# Patient Record
Sex: MALE | Race: WHITE | Employment: OTHER | URBAN - METROPOLITAN AREA
[De-identification: names, ages, dates, MRNs, and addresses within clinical notes are randomized per-mention and may not be internally consistent; named-entity substitution may affect disease eponyms.]

---

## 2017-01-09 ENCOUNTER — NURSE ONLY (OUTPATIENT)
Dept: INTERNAL MEDICINE CLINIC | Facility: CLINIC | Age: 62
End: 2017-01-09

## 2017-01-09 ENCOUNTER — PRIOR ORIGINAL RECORDS (OUTPATIENT)
Dept: OTHER | Age: 62
End: 2017-01-09

## 2017-01-09 DIAGNOSIS — R31.9 BLOOD IN URINE: ICD-10-CM

## 2017-01-09 DIAGNOSIS — Z00.00 LABORATORY EXAMINATION ORDERED AS PART OF A ROUTINE GENERAL MEDICAL EXAMINATION: Primary | ICD-10-CM

## 2017-01-09 LAB
APPEARANCE: CLEAR
BILIRUBIN: NEGATIVE
GLUCOSE (URINE DIPSTICK): NEGATIVE MG/DL
KETONES (URINE DIPSTICK): NEGATIVE MG/DL
LEUKOCYTES: NEGATIVE
MULTISTIX LOT#: NORMAL NUMERIC
NITRITE, URINE: NEGATIVE
PH, URINE: 5.5 (ref 4.5–8)
PROTEIN (URINE DIPSTICK): NEGATIVE MG/DL
SPECIFIC GRAVITY: 1.02 (ref 1–1.03)
URINE-COLOR: YELLOW
UROBILINOGEN,SEMI-QN: 0.2 MG/DL (ref 0–1.9)

## 2017-01-09 PROCEDURE — 94010 BREATHING CAPACITY TEST: CPT | Performed by: INTERNAL MEDICINE

## 2017-01-09 PROCEDURE — 92551 PURE TONE HEARING TEST AIR: CPT | Performed by: INTERNAL MEDICINE

## 2017-01-09 PROCEDURE — 93000 ELECTROCARDIOGRAM COMPLETE: CPT | Performed by: INTERNAL MEDICINE

## 2017-01-09 PROCEDURE — 87086 URINE CULTURE/COLONY COUNT: CPT | Performed by: INTERNAL MEDICINE

## 2017-01-09 PROCEDURE — 99173 VISUAL ACUITY SCREEN: CPT | Performed by: INTERNAL MEDICINE

## 2017-01-09 NOTE — PROGRESS NOTES
*LOOKIN' BODY RESULTS    YES: x      NO:       REASON TEST NOT PERFORMED:        HEIGHT: 5'10.5    WEIGHT: 208  _____________________________________________________________________________  *VENIPUNCTURE    YES:  x     NO:        REASON VENIPUNCTURE NOT P

## 2017-01-18 ENCOUNTER — PRIOR ORIGINAL RECORDS (OUTPATIENT)
Dept: OTHER | Age: 62
End: 2017-01-18

## 2017-01-19 ENCOUNTER — MED REC SCAN ONLY (OUTPATIENT)
Dept: INTERNAL MEDICINE CLINIC | Facility: CLINIC | Age: 62
End: 2017-01-19

## 2017-01-24 ENCOUNTER — TELEPHONE (OUTPATIENT)
Dept: INTERNAL MEDICINE CLINIC | Facility: CLINIC | Age: 62
End: 2017-01-24

## 2017-01-24 NOTE — TELEPHONE ENCOUNTER
Pt calling regarding [facility] lab billing issue; criselda called [facility] pt was billed in error for his CBC,CMP they will credit account pt responsible for PSA pt aware verbalized understanding

## 2017-01-24 NOTE — PROGRESS NOTES
HEALTH MAINTENANCE:        Immunization History  Administered            Date(s) Administered    Influenza             10/05/2011  11/07/2012  11/18/2013                            11/03/2014  10/20/2015      Influenza Vaccine, No Preserv, 3YR + Negative/Trace mg/dL Final 01/18/2017 10:11 AM Unknown     UROBILINOGEN,SEMI-QN 0.2 0.0 - 1.9 mg/dL Final 01/18/2017 10:11 AM Unknown     NITRITE, URINE neg Negative Final 01/18/2017 10:11 AM Unknown     LEUKOCYTES neg Negative Final 01/18/2017 10:11 AM Un

## 2017-01-25 ENCOUNTER — OFFICE VISIT (OUTPATIENT)
Dept: INTERNAL MEDICINE CLINIC | Facility: CLINIC | Age: 62
End: 2017-01-25

## 2017-01-25 VITALS
SYSTOLIC BLOOD PRESSURE: 132 MMHG | BODY MASS INDEX: 29.45 KG/M2 | OXYGEN SATURATION: 98 % | HEIGHT: 70.5 IN | HEART RATE: 70 BPM | RESPIRATION RATE: 14 BRPM | TEMPERATURE: 98 F | WEIGHT: 208 LBS | DIASTOLIC BLOOD PRESSURE: 82 MMHG

## 2017-01-25 DIAGNOSIS — Z00.01 ENCOUNTER FOR GENERAL ADULT MEDICAL EXAMINATION WITH ABNORMAL FINDINGS: Primary | ICD-10-CM

## 2017-01-25 DIAGNOSIS — R73.03 PREDIABETES: ICD-10-CM

## 2017-01-25 PROCEDURE — 99396 PREV VISIT EST AGE 40-64: CPT | Performed by: INTERNAL MEDICINE

## 2017-01-25 NOTE — PROGRESS NOTES
Pt to have A1C in 3 mo. Mailed order. Sent letter to pt to have him schedule consult with sleep Dr to review CPAP.

## 2017-01-25 NOTE — PROGRESS NOTES
HPI:    Patient ID: Wesley Rodríguez is a 64year old male here for annual 1-25-17    HPI      No new problems:      Review of Systems   Constitutional: Negative.     HENT:        Tonsillectomy,old  facial scar   Eyes: Positive for visual disturbance (fl Rfl:    Simvastatin 10 MG Oral Tab Take 10 mg by mouth nightly. Disp:  Rfl:    Aspirin 81 MG Oral Tab Take 81 mg by mouth daily.  Disp:  Rfl:        Allergies:  Flomax [Tamsulosin]     Dizziness  Aleve                   Rash  Levaquin [Levofloxa*    Diarrhe blood   • Facial laceration 1970     60 stitches to face   • Hemorrhoids, internal 1/12     colonoscopy-Mace   • Diverticulosis 1/12     colonoscopy-Mace   • Gallstones 3/11     minda   • Mitral valve disease 2/04     Manriquez repair   • Rheumatic fever child Fib   • Other[Other] [OTHER] Mother    • psoriasis[Other] [OTHER] Mother    • Heart Disease Sister      TIAs/stent   • High Blood Pressure Sister    • Heart Disorder Sister      mvp   • Heart Disorder Son      bi cuspid valve   • Depression Son    • migrai Ref Range Negative/Trace mg/dL Status: Final   UROBILINOGEN,SEMI-QN Date: 01/18/2017   Value: 0.2  Ref Range 0.0 - 1.9 mg/dL Status: Final   NITRITE, URINE Date: 01/18/2017   Value: neg  Ref Range Negative Status: Final   LEUKOCYTES Date: 01/18/2017   Susanne to 1.0    ADMA: Normal 71    SDMA: Normal 93  ______________________________________________________________________  LIPID PANEL    APOLIPOPROTEIN B: Normal 75    TOTAL CHOLESTEROL: Normal 158    LDL: Normal 84    DIRECT HDL: Normal 57    TRIGLYCERIDES: N TESTS    EKG: Abnormal unchanged  Sinus rhythm  Leftward axis  Inferior wall     SPIROMETRY: Normal    HEARING: Normal    Hearing Screening     Time taken: 1/9/2017  9:48 AM    Entry User: David Siu   Screening Method:  Audiometry    Left Ear @ 25dBHL -

## 2017-01-31 NOTE — PROGRESS NOTES
1      REASON FOR VISIT: [facility] annual on 01/25/2017:    1.  Regarding cardiovascular health, you have a history of mitral valve repair that is stable on recent echo in 09/2016, hypertension under control, lipids under control, and prediabetes which could im control with diet, exercise, and further weight and sugar reduction will help your A1C to be rechecked in 3 months. I know you are on the way to [place] today, enjoy your time off and we will see you in 3 months.

## 2017-02-03 ENCOUNTER — MED REC SCAN ONLY (OUTPATIENT)
Dept: INTERNAL MEDICINE CLINIC | Facility: CLINIC | Age: 62
End: 2017-02-03

## 2017-02-03 ENCOUNTER — TELEPHONE (OUTPATIENT)
Dept: INTERNAL MEDICINE CLINIC | Facility: CLINIC | Age: 62
End: 2017-02-03

## 2017-02-03 NOTE — TELEPHONE ENCOUNTER
Sachi Villa would like to talk to the nurse regarding some symptoms he has since he's been home from [place]. He doesn't know if its allergies or something else. Itchy eyes, sneezing, rash on elbows and behind knees.   Please call [phone], home or [phone]

## 2017-02-03 NOTE — TELEPHONE ENCOUNTER
Pt reports no fever; says symptoms have been going on for 3-4 days. Pt wanted to know if he can try Benadryl. I told him that is fine, he can try Benadryl OTC and use as directed on box. Reports he has used Flonase and that helps with sneezing.    Pt al

## 2017-02-06 ENCOUNTER — TELEPHONE (OUTPATIENT)
Dept: INTERNAL MEDICINE CLINIC | Facility: CLINIC | Age: 62
End: 2017-02-06

## 2017-02-06 ENCOUNTER — PRIOR ORIGINAL RECORDS (OUTPATIENT)
Dept: OTHER | Age: 62
End: 2017-02-06

## 2017-02-06 NOTE — TELEPHONE ENCOUNTER
Morena Cornell is still having very itchy eyes after having taking Benedryl and [employer] eyedrops all weekend, per advice from the nurse last .  He thinks he may have scratched his eyes from rubbing them.  He would rather not have an appointment and is wondering if th

## 2017-02-06 NOTE — TELEPHONE ENCOUNTER
Pt reports eyes have not gotten worse, but no improvement either. Itching, burning, little bit of discharge. Still some congestion. Pt thinks allergy related. Told pt he needs to be seen so that Dr can decide what he should be using.    Future Appointm

## 2017-02-08 LAB
ALBUMIN: 4.7 G/DL
ALKALINE PHOSPHATATE(ALK PHOS): 56 IU/L
APOLIPOPROTEIN(B): 75 MG/DL
APOLIPOPROTEIN(B): 75 MG/DL
BILIRUBIN TOTAL: 0.6 MG/DL
BUN: 18 MG/DL
CALCIUM: 9.6 MG/DL
CHLORIDE: 102 MEQ/L
CHOLESTEROL, TOTAL: 158 MG/DL
CHOLESTEROL, TOTAL: 158 MG/DL
CREATININE, SERUM: 0.87 MG/DL
GLOBULIN: 2.5 G/DL
GLUCOSE: 118 MG/DL
GLUCOSE: 118 MG/DL
HDL CHOLESTEROL: 57 MG/DL
HDL CHOLESTEROL: 57 MG/DL
HEMATOCRIT: 42.1 %
HEMOGLOBIN A1C: 6.4 %
HEMOGLOBIN A1C: 6.4 %
HEMOGLOBIN: 14.2 G/DL
HSCRP(TYPE Y): 1.1 YES
LDL CHOLESTEROL: 84 MG/DL
LDL CHOLESTEROL: 84 MG/DL
PLATELETS: 195 K/UL
POTASSIUM, SERUM: 4.3 MEQ/L
PROTEIN, TOTAL: 7.2 G/DL
RED BLOOD COUNT: 5.23 X 10-6/U
SGOT (AST): 25 IU/L
SGPT (ALT): 45 IU/L
SODIUM: 141 MEQ/L
THYROID STIMULATING HORMONE: 2.34 MLU/L
THYROID STIMULATING HORMONE: 2.34 MLU/L
TRIGLYCERIDES: 84 MG/DL
TRIGLYCERIDES: 84 MG/DL
URIC ACID: 6.3 MG/DL
URIC ACID: 6.3 MG/DL
VITAMIN D 25-OH: 49.7 NG/ML
VITAMIN D 25-OH: 49.7 NG/ML
WHITE BLOOD COUNT: 7.4 X 10-3/U

## 2017-02-09 ENCOUNTER — TELEPHONE (OUTPATIENT)
Dept: INTERNAL MEDICINE CLINIC | Facility: CLINIC | Age: 62
End: 2017-02-09

## 2017-02-09 RX ORDER — AZITHROMYCIN 250 MG/1
TABLET, FILM COATED ORAL
Qty: 6 TABLET | Refills: 0 | Status: SHIPPED | OUTPATIENT
Start: 2017-02-09 | End: 2017-10-17 | Stop reason: ALTCHOICE

## 2017-02-09 NOTE — TELEPHONE ENCOUNTER
Pt c/o of sore throat, cough, nasal congestion, flying on Saturday vo dr Shivani Jeter start zpak follow tomorrow apt for 8:30am pt verbalized undersatnding

## 2017-02-10 ENCOUNTER — OFFICE VISIT (OUTPATIENT)
Dept: INTERNAL MEDICINE CLINIC | Facility: CLINIC | Age: 62
End: 2017-02-10

## 2017-02-10 VITALS
HEART RATE: 80 BPM | SYSTOLIC BLOOD PRESSURE: 140 MMHG | DIASTOLIC BLOOD PRESSURE: 70 MMHG | TEMPERATURE: 98 F | OXYGEN SATURATION: 97 % | RESPIRATION RATE: 14 BRPM | WEIGHT: 213 LBS | BODY MASS INDEX: 30 KG/M2

## 2017-02-10 DIAGNOSIS — J01.10 ACUTE NON-RECURRENT FRONTAL SINUSITIS: Primary | ICD-10-CM

## 2017-02-10 PROCEDURE — 99213 OFFICE O/P EST LOW 20 MIN: CPT | Performed by: INTERNAL MEDICINE

## 2017-02-10 PROCEDURE — 96372 THER/PROPH/DIAG INJ SC/IM: CPT | Performed by: INTERNAL MEDICINE

## 2017-02-10 RX ORDER — METHYLPREDNISOLONE 4 MG/1
TABLET ORAL
Qty: 1 KIT | Refills: 0 | Status: SHIPPED | OUTPATIENT
Start: 2017-02-10 | End: 2017-10-17 | Stop reason: ALTCHOICE

## 2017-02-10 RX ORDER — METHYLPREDNISOLONE ACETATE 40 MG/ML
40 INJECTION, SUSPENSION INTRA-ARTICULAR; INTRALESIONAL; INTRAMUSCULAR; SOFT TISSUE ONCE
Status: COMPLETED | OUTPATIENT
Start: 2017-02-10 | End: 2017-02-10

## 2017-02-10 RX ADMIN — METHYLPREDNISOLONE ACETATE 40 MG: 40 INJECTION, SUSPENSION INTRA-ARTICULAR; INTRALESIONAL; INTRAMUSCULAR; SOFT TISSUE at 09:07:00

## 2017-02-10 NOTE — PROGRESS NOTES
1 week hx what sounds like allergic reaction while in [place]=rash, itching eyes, stuffed nose. Then couple days ago worse, now with hoarseness and sore throat. Has severe rhinitis. Ears and lungs clear. Rx Nasalcort, Medrol, Zpack and Ophcon already started.  Adolfo Crockett

## 2017-02-14 ENCOUNTER — PRIOR ORIGINAL RECORDS (OUTPATIENT)
Dept: OTHER | Age: 62
End: 2017-02-14

## 2017-03-08 ENCOUNTER — PRIOR ORIGINAL RECORDS (OUTPATIENT)
Dept: OTHER | Age: 62
End: 2017-03-08

## 2017-03-10 ENCOUNTER — PRIOR ORIGINAL RECORDS (OUTPATIENT)
Dept: OTHER | Age: 62
End: 2017-03-10

## 2017-03-27 ENCOUNTER — MED REC SCAN ONLY (OUTPATIENT)
Dept: INTERNAL MEDICINE CLINIC | Facility: CLINIC | Age: 62
End: 2017-03-27

## 2017-05-01 ENCOUNTER — PRIOR ORIGINAL RECORDS (OUTPATIENT)
Dept: OTHER | Age: 62
End: 2017-05-01

## 2017-09-19 ENCOUNTER — MED REC SCAN ONLY (OUTPATIENT)
Dept: INTERNAL MEDICINE CLINIC | Facility: CLINIC | Age: 62
End: 2017-09-19

## 2017-10-16 ENCOUNTER — PRIOR ORIGINAL RECORDS (OUTPATIENT)
Dept: OTHER | Age: 62
End: 2017-10-16

## 2017-10-16 ENCOUNTER — PATIENT MESSAGE (OUTPATIENT)
Dept: INTERNAL MEDICINE CLINIC | Facility: CLINIC | Age: 62
End: 2017-10-16

## 2017-10-16 ENCOUNTER — LAB ENCOUNTER (OUTPATIENT)
Dept: LAB | Age: 62
End: 2017-10-16
Attending: INTERNAL MEDICINE
Payer: COMMERCIAL

## 2017-10-16 DIAGNOSIS — R73.03 PREDIABETES: ICD-10-CM

## 2017-10-16 DIAGNOSIS — N40.1 BENIGN NODULAR PROSTATIC HYPERPLASIA WITH LOWER URINARY TRACT SYMPTOMS: ICD-10-CM

## 2017-10-16 DIAGNOSIS — R06.00 DYSPNEA, PAROXYSMAL NOCTURNAL: ICD-10-CM

## 2017-10-16 DIAGNOSIS — R42 DIZZINESS AND GIDDINESS: ICD-10-CM

## 2017-10-16 DIAGNOSIS — R53.83 FATIGUE: ICD-10-CM

## 2017-10-16 DIAGNOSIS — E55.9 AVITAMINOSIS D: Primary | ICD-10-CM

## 2017-10-16 DIAGNOSIS — E78.00 PURE HYPERCHOLESTEROLEMIA: ICD-10-CM

## 2017-10-16 PROCEDURE — 82306 VITAMIN D 25 HYDROXY: CPT

## 2017-10-16 PROCEDURE — 84153 ASSAY OF PSA TOTAL: CPT

## 2017-10-16 PROCEDURE — 83036 HEMOGLOBIN GLYCOSYLATED A1C: CPT

## 2017-10-16 PROCEDURE — 36415 COLL VENOUS BLD VENIPUNCTURE: CPT

## 2017-10-16 PROCEDURE — 80053 COMPREHEN METABOLIC PANEL: CPT

## 2017-10-16 PROCEDURE — 80061 LIPID PANEL: CPT

## 2017-10-16 NOTE — TELEPHONE ENCOUNTER
From: Cheyenne Lindo  To: Alondra Johnson MD  Sent: 10/16/2017 11:23 AM CDT  Subject: Non-Urgent Medical Question    Our son and daughter in law are expecting their first child and our first grandchild in December.  I received a TDAP vaccine on 11-

## 2017-10-16 NOTE — TELEPHONE ENCOUNTER
Dr Luz Marina Stewart - en's Tdap should be good for a few more years. .. I don't believe he needs anything else before the baby comes, correct? Please advise.

## 2017-10-17 ENCOUNTER — PRIOR ORIGINAL RECORDS (OUTPATIENT)
Dept: OTHER | Age: 62
End: 2017-10-17

## 2017-10-17 PROCEDURE — 81015 MICROSCOPIC EXAM OF URINE: CPT | Performed by: UROLOGY

## 2017-10-17 NOTE — TELEPHONE ENCOUNTER
From: Alejandro Barnes  To: Elida Chapman MD  Sent: 10/16/2017 11:20 AM CDT  Subject: Other    Please note in my records that I received a flu vaccine (at [facility]) on October 14, 2017. Flucelvax Quad [id number] SYR.

## 2017-10-23 ENCOUNTER — HOSPITAL ENCOUNTER (OUTPATIENT)
Dept: ULTRASOUND IMAGING | Facility: HOSPITAL | Age: 62
Discharge: HOME | End: 2017-10-23
Attending: UROLOGY
Payer: COMMERCIAL

## 2017-10-23 DIAGNOSIS — N40.1 BENIGN NODULAR PROSTATIC HYPERPLASIA WITH LOWER URINARY TRACT SYMPTOMS: ICD-10-CM

## 2017-10-23 DIAGNOSIS — R36.1 HEMATOSPERMIA: ICD-10-CM

## 2017-10-23 PROCEDURE — 76775 US EXAM ABDO BACK WALL LIM: CPT | Performed by: UROLOGY

## 2017-10-26 LAB
ALBUMIN: 3.8 G/DL
ALKALINE PHOSPHATATE(ALK PHOS): 72 IU/L
BILIRUBIN TOTAL: 0.8 MG/DL
BUN: 14 MG/DL
CALCIUM: 8.8 MG/DL
CHLORIDE: 109 MEQ/L
CHOLESTEROL, TOTAL: 134 MG/DL
CREATININE, SERUM: 0.87 MG/DL
GLUCOSE: 114 MG/DL
HDL CHOLESTEROL: 67 MG/DL
HEMOGLOBIN A1C: 6.3 %
LDL CHOLESTEROL: 51 MG/DL
POTASSIUM, SERUM: 3.8 MEQ/L
PROTEIN, TOTAL: 7.4 G/DL
SGOT (AST): 20 IU/L
SGPT (ALT): 34 IU/L
SODIUM: 142 MEQ/L
TRIGLYCERIDES: 81 MG/DL
VITAMIN D 25-OH: 46.8 NG/ML

## 2017-11-03 ENCOUNTER — HOSPITAL ENCOUNTER (OUTPATIENT)
Dept: CV DIAGNOSTICS | Facility: HOSPITAL | Age: 62
Discharge: HOME | End: 2017-11-03
Attending: INTERNAL MEDICINE

## 2017-11-03 ENCOUNTER — HOSPITAL ENCOUNTER (OUTPATIENT)
Dept: CV DIAGNOSTICS | Facility: HOSPITAL | Age: 62
End: 2017-11-03
Attending: INTERNAL MEDICINE

## 2017-11-03 ENCOUNTER — MYAURORA ACCOUNT LINK (OUTPATIENT)
Dept: OTHER | Age: 62
End: 2017-11-03

## 2017-11-03 DIAGNOSIS — Z95.2 HX OF PROSTHETIC MITRAL VALVE: ICD-10-CM

## 2017-11-03 DIAGNOSIS — I34.1 MVP (MITRAL VALVE PROLAPSE): ICD-10-CM

## 2017-11-06 ENCOUNTER — PRIOR ORIGINAL RECORDS (OUTPATIENT)
Dept: OTHER | Age: 62
End: 2017-11-06

## 2018-01-02 ENCOUNTER — TELEPHONE (OUTPATIENT)
Dept: INTERNAL MEDICINE CLINIC | Facility: CLINIC | Age: 63
End: 2018-01-02

## 2018-01-02 RX ORDER — OSELTAMIVIR PHOSPHATE 75 MG/1
75 CAPSULE ORAL 2 TIMES DAILY
Qty: 10 CAPSULE | Refills: 0 | Status: SHIPPED | OUTPATIENT
Start: 2018-01-02 | End: 2018-03-27

## 2018-01-02 NOTE — TELEPHONE ENCOUNTER
Patient states Dr. Mayda Cabrera told him to start taking his wife's Tamiflu and that Dr. Daniela Early would call in Rx for full strength BID not once today and let patient know when he can pick it up.

## 2018-01-24 ENCOUNTER — TELEPHONE (OUTPATIENT)
Dept: INTERNAL MEDICINE CLINIC | Facility: CLINIC | Age: 63
End: 2018-01-24

## 2018-02-16 ENCOUNTER — PRIOR ORIGINAL RECORDS (OUTPATIENT)
Dept: OTHER | Age: 63
End: 2018-02-16

## 2018-02-28 ENCOUNTER — PRIOR ORIGINAL RECORDS (OUTPATIENT)
Dept: OTHER | Age: 63
End: 2018-02-28

## 2018-03-08 ENCOUNTER — NURSE ONLY (OUTPATIENT)
Dept: INTERNAL MEDICINE CLINIC | Facility: CLINIC | Age: 63
End: 2018-03-08

## 2018-03-08 DIAGNOSIS — Z00.00 LABORATORY EXAMINATION ORDERED AS PART OF A ROUTINE GENERAL MEDICAL EXAMINATION: Primary | ICD-10-CM

## 2018-03-08 LAB
AMB EXT LDL CHOLESTEROL, DIRECT: 74 MG/DL
AMB EXT PSA SCREEN: 1.87 NG/ML
BILIRUB UR QL STRIP.AUTO: NEGATIVE
CLARITY UR REFRACT.AUTO: CLEAR
COLOR UR AUTO: YELLOW
GLUCOSE UR STRIP.AUTO-MCNC: NEGATIVE MG/DL
KETONES UR STRIP.AUTO-MCNC: NEGATIVE MG/DL
LEUKOCYTE ESTERASE UR QL STRIP.AUTO: NEGATIVE
NITRITE UR QL STRIP.AUTO: NEGATIVE
PH UR STRIP.AUTO: 5 [PH] (ref 4.5–8)
PROT UR STRIP.AUTO-MCNC: NEGATIVE MG/DL
RBC UR QL AUTO: NEGATIVE
SP GR UR STRIP.AUTO: 1.02 (ref 1–1.03)
UROBILINOGEN UR STRIP.AUTO-MCNC: <2 MG/DL

## 2018-03-08 PROCEDURE — 93000 ELECTROCARDIOGRAM COMPLETE: CPT | Performed by: INTERNAL MEDICINE

## 2018-03-08 PROCEDURE — 81003 URINALYSIS AUTO W/O SCOPE: CPT | Performed by: INTERNAL MEDICINE

## 2018-03-08 PROCEDURE — 99173 VISUAL ACUITY SCREEN: CPT | Performed by: INTERNAL MEDICINE

## 2018-03-08 PROCEDURE — 94010 BREATHING CAPACITY TEST: CPT | Performed by: INTERNAL MEDICINE

## 2018-03-08 PROCEDURE — 92551 PURE TONE HEARING TEST AIR: CPT | Performed by: INTERNAL MEDICINE

## 2018-03-08 NOTE — PROGRESS NOTES
*LOOKIN' BODY RESULTS    YES: X      NO:       REASON TEST NOT PERFORMED:        HEIGHT: 5'10.5   WEIGHT: 203  _____________________________________________________________________________  *VENIPUNCTURE    YES:  X     NO:        REASON VENIPUNCTURE NOT PE

## 2018-03-13 ENCOUNTER — MED REC SCAN ONLY (OUTPATIENT)
Dept: INTERNAL MEDICINE CLINIC | Facility: CLINIC | Age: 63
End: 2018-03-13

## 2018-03-20 ENCOUNTER — PATIENT MESSAGE (OUTPATIENT)
Dept: INTERNAL MEDICINE CLINIC | Facility: CLINIC | Age: 63
End: 2018-03-20

## 2018-03-20 NOTE — TELEPHONE ENCOUNTER
From: Jamie Bobby  To: Sidra Bloom MD  Sent: 3/20/2018 2:00 PM CDT  Subject: Non-Urgent Medical Question    Should I receive the new shingles vaccine, Shingrix?  I understand that the new Shingrix vaccine is about 97% effective for 20 years, wherea

## 2018-03-22 NOTE — PROGRESS NOTES
HEALTH MAINTENANCE:        Immunization History  Administered            Date(s) Administered    Influenza             10/05/2011  11/07/2012  11/18/2013                            11/03/2014  10/20/2015  10/14/2017      Influenza Vaccine, No Preserv, Samaria Lin - 500 Hz: Pass   Left Ear @ 40dBHL - 1000 Hz: Pass Right Ear @ 40 dBHL - 1000 Hz: Pass   Left Ear @ 40dBHL - 2000 Hz: Pass Right Ear @ 40 dBHL - 2000 Hz: Pass   Left Ear @ 40dBHL - 4000 Hz: Pass Right Ear @ 40 dBHL - 4000 Hz: Pass

## 2018-03-27 ENCOUNTER — OFFICE VISIT (OUTPATIENT)
Dept: INTERNAL MEDICINE CLINIC | Facility: CLINIC | Age: 63
End: 2018-03-27

## 2018-03-27 VITALS
RESPIRATION RATE: 12 BRPM | TEMPERATURE: 98 F | BODY MASS INDEX: 28.74 KG/M2 | WEIGHT: 203 LBS | HEIGHT: 70.5 IN | DIASTOLIC BLOOD PRESSURE: 77 MMHG | OXYGEN SATURATION: 98 % | SYSTOLIC BLOOD PRESSURE: 137 MMHG | HEART RATE: 68 BPM

## 2018-03-27 DIAGNOSIS — Z80.0 FAMILY HISTORY OF COLON CANCER: ICD-10-CM

## 2018-03-27 DIAGNOSIS — G47.33 OSA (OBSTRUCTIVE SLEEP APNEA): ICD-10-CM

## 2018-03-27 DIAGNOSIS — Z98.890 STATUS POST MITRAL VALVE REPAIR: ICD-10-CM

## 2018-03-27 DIAGNOSIS — R73.03 PREDIABETES: ICD-10-CM

## 2018-03-27 DIAGNOSIS — Z23 NEED FOR VACCINATION: ICD-10-CM

## 2018-03-27 DIAGNOSIS — I10 ESSENTIAL HYPERTENSION: ICD-10-CM

## 2018-03-27 DIAGNOSIS — I65.23 CAROTID ARTERY PLAQUE, BILATERAL: ICD-10-CM

## 2018-03-27 DIAGNOSIS — R97.20 BPH WITH ELEVATED PSA: ICD-10-CM

## 2018-03-27 DIAGNOSIS — N40.0 BPH WITH ELEVATED PSA: ICD-10-CM

## 2018-03-27 DIAGNOSIS — Z00.01 ENCOUNTER FOR GENERAL ADULT MEDICAL EXAMINATION WITH ABNORMAL FINDINGS: Primary | ICD-10-CM

## 2018-03-27 PROCEDURE — 90732 PPSV23 VACC 2 YRS+ SUBQ/IM: CPT | Performed by: INTERNAL MEDICINE

## 2018-03-27 PROCEDURE — 99396 PREV VISIT EST AGE 40-64: CPT | Performed by: INTERNAL MEDICINE

## 2018-03-27 PROCEDURE — 90471 IMMUNIZATION ADMIN: CPT | Performed by: INTERNAL MEDICINE

## 2018-03-28 ENCOUNTER — TELEPHONE (OUTPATIENT)
Dept: INTERNAL MEDICINE CLINIC | Facility: CLINIC | Age: 63
End: 2018-03-28

## 2018-03-28 DIAGNOSIS — R73.03 PREDIABETES: Primary | ICD-10-CM

## 2018-03-28 DIAGNOSIS — G47.33 OSA (OBSTRUCTIVE SLEEP APNEA): ICD-10-CM

## 2018-03-28 NOTE — PROGRESS NOTES
HPI:    Patient ID: Yony Peace is a 58year old male CPE 3-27-18:     Multiple but stable medical problems        Review of Systems   Constitutional: Negative.     HENT:        Tonsillectomy, old  facial scar   Eyes: Positive for visual disturbance by mouth daily. Disp:  Rfl:    Simvastatin 10 MG Oral Tab Take 10 mg by mouth nightly. Disp:  Rfl:    Aspirin 81 MG Oral Tab Take 81 mg by mouth daily.  Disp:  Rfl:        Allergies:  Flomax [Tamsulosin]     Dizziness  Aleve                   Rash  Lachelleuin Chickenpox child    measles, mumps   • Diverticulosis 1/12    colonoscopy-oRdri   • Dyslipidemia    • Essential hypertension    • Facial laceration 1970    60 stitches to face   • Family history of colon cancer     father   • Gallstones 3/11    minda   • He Mother    • psoriasis Adamaris Martinez Mother    • Heart Disease Sister      TIAs/stent   • High Blood Pressure Sister    • Heart Disorder Sister      mvp   • Heart Disorder Son      bi cuspid valve   • Depression Son    • colon cancer [OTHER] Paternal Grandfather Health Inventory: Normal  Metabolic syndrome (hypertension, lipid abnormality, obesity, pre-diabetic): Prediabetes, lipids, HTN present  Mini-Mental Examination: Normal in conversation  Nutritional Screen: Reviewed and discussed   SAFE Questions (Domestic 2000 Hz: Pass Right Ear @ 25dBHL - 2000 Hz: Pass   Left Ear @ 25dBHL - 4000 Hz: Pass Right Ear @ 25dBHL - 4000 Hz: Pass   Left Ear @ 40dBHL - 500 Hz: Pass Right Ear @ 40 dBHL - 500 Hz: Pass   Left Ear @ 40dBHL - 1000 Hz: Pass Right Ear @ 40 dBHL - 1000 Hz: 5yrs-family hx colon CA. No reason to have lung cancer being a nonsmoker. No sign of skin cancer on exam.    3. Obstructive sleep apnea, stable on CPAP.  Recommend you see your sleep physician if not done in last 2-3 years just to update the equipment and s

## 2018-03-28 NOTE — TELEPHONE ENCOUNTER
Per Dr Yolette Negron:   1. Have A1C repeated 3 mo. 2. Have TMAO 6 mo - copy to [place]  3. Ask when pt was last evaluated for CPAP. Recommendation is every 2-3 years for updated equipment/settings.

## 2018-05-01 ENCOUNTER — HOSPITAL ENCOUNTER (OUTPATIENT)
Dept: CV DIAGNOSTICS | Facility: HOSPITAL | Age: 63
Discharge: HOME | End: 2018-05-01
Attending: INTERNAL MEDICINE
Payer: COMMERCIAL

## 2018-05-01 ENCOUNTER — TELEPHONE (OUTPATIENT)
Dept: INTERNAL MEDICINE CLINIC | Facility: CLINIC | Age: 63
End: 2018-05-01

## 2018-05-01 ENCOUNTER — HOSPITAL ENCOUNTER (OUTPATIENT)
Dept: ULTRASOUND IMAGING | Facility: HOSPITAL | Age: 63
Discharge: HOME | End: 2018-05-01
Attending: INTERNAL MEDICINE
Payer: COMMERCIAL

## 2018-05-01 DIAGNOSIS — R07.2 CHEST PAIN, PRECORDIAL: ICD-10-CM

## 2018-05-01 DIAGNOSIS — R06.00 DYSPNEA, PAROXYSMAL NOCTURNAL: ICD-10-CM

## 2018-05-01 DIAGNOSIS — Z95.2 PERSONAL HISTORY OF HEART VALVE REPLACEMENT: ICD-10-CM

## 2018-05-01 DIAGNOSIS — I65.23 CAROTID ARTERY PLAQUE, BILATERAL: ICD-10-CM

## 2018-05-01 PROCEDURE — 93018 CV STRESS TEST I&R ONLY: CPT | Performed by: INTERNAL MEDICINE

## 2018-05-01 PROCEDURE — 93017 CV STRESS TEST TRACING ONLY: CPT | Performed by: INTERNAL MEDICINE

## 2018-05-01 PROCEDURE — 93350 STRESS TTE ONLY: CPT | Performed by: INTERNAL MEDICINE

## 2018-05-01 PROCEDURE — 93880 EXTRACRANIAL BILAT STUDY: CPT | Performed by: INTERNAL MEDICINE

## 2018-05-01 NOTE — TELEPHONE ENCOUNTER
----- Message from Devorah Braswell MD sent at 5/1/2018 11:21 AM CDT -----  Right clear, left mild plaque cont statin and ASA  Recheck 3yrs

## 2018-05-01 NOTE — TELEPHONE ENCOUNTER
Heaven Krueger MD sent to [facility]             Normal stress echo      PC regarding Echo normal and Carotid US results. Will repeat carotid US in 3 yrs.

## 2018-05-02 ENCOUNTER — PRIOR ORIGINAL RECORDS (OUTPATIENT)
Dept: OTHER | Age: 63
End: 2018-05-02

## 2018-05-07 ENCOUNTER — PRIOR ORIGINAL RECORDS (OUTPATIENT)
Dept: OTHER | Age: 63
End: 2018-05-07

## 2018-05-08 ENCOUNTER — MED REC SCAN ONLY (OUTPATIENT)
Dept: INTERNAL MEDICINE CLINIC | Facility: CLINIC | Age: 63
End: 2018-05-08

## 2018-10-23 ENCOUNTER — PRIOR ORIGINAL RECORDS (OUTPATIENT)
Dept: OTHER | Age: 63
End: 2018-10-23

## 2018-10-30 ENCOUNTER — MYAURORA ACCOUNT LINK (OUTPATIENT)
Dept: OTHER | Age: 63
End: 2018-10-30

## 2018-10-30 ENCOUNTER — HOSPITAL ENCOUNTER (OUTPATIENT)
Dept: CV DIAGNOSTICS | Facility: HOSPITAL | Age: 63
Discharge: HOME | End: 2018-10-30
Attending: INTERNAL MEDICINE

## 2018-10-30 DIAGNOSIS — Z95.2 HISTORY OF MITRAL VALVE PROSTHESIS: ICD-10-CM

## 2018-10-30 DIAGNOSIS — R06.00 DYSPNEA, UNSPECIFIED TYPE: ICD-10-CM

## 2018-11-05 ENCOUNTER — PRIOR ORIGINAL RECORDS (OUTPATIENT)
Dept: OTHER | Age: 63
End: 2018-11-05

## 2018-11-05 ENCOUNTER — MYAURORA ACCOUNT LINK (OUTPATIENT)
Dept: OTHER | Age: 63
End: 2018-11-05

## 2018-11-07 ENCOUNTER — TELEPHONE (OUTPATIENT)
Dept: INTERNAL MEDICINE CLINIC | Facility: CLINIC | Age: 63
End: 2018-11-07

## 2018-11-07 NOTE — TELEPHONE ENCOUNTER
Patient needs a new prescription for test strips. He would like to pick them up [facility] on [address] in [place].

## 2018-11-20 ENCOUNTER — PATIENT MESSAGE (OUTPATIENT)
Dept: INTERNAL MEDICINE CLINIC | Facility: CLINIC | Age: 63
End: 2018-11-20

## 2018-11-20 ENCOUNTER — TELEPHONE (OUTPATIENT)
Dept: INTERNAL MEDICINE CLINIC | Facility: CLINIC | Age: 63
End: 2018-11-20

## 2018-11-20 DIAGNOSIS — G47.33 OSA (OBSTRUCTIVE SLEEP APNEA): Primary | ICD-10-CM

## 2018-11-20 NOTE — TELEPHONE ENCOUNTER
Will enter new sleep study to [address] [place] [address] location. Previous on to [address] [facility]. Entered new sleep study/ CPAP titration to [facility] location and called pt. He said he was able to schedule study with previous order.

## 2018-11-20 NOTE — TELEPHONE ENCOUNTER
From: Clara Torrez  To: Rafy Blackwell MD  Sent: 11/20/2018 3:08 PM CST  Subject: Other    Please update my records to reflect:  9/25/28 flu shot  10/30/18 echocardiogram at [facility]  11/20/18 eye exam  12/10/18 scheduled Sleep study

## 2018-11-20 NOTE — TELEPHONE ENCOUNTER
From: Wesley Rodríguez  To: Maria M Rea MD  Sent: 11/20/2018 3:10 PM CST  Subject: Non-Urgent Medical Question    What are you now recommending regarding the new shingles vaccine?    I had zostavax on 7/15/2015

## 2018-11-20 NOTE — TELEPHONE ENCOUNTER
Patient is trying to schedule a sleep study at the [facility] on [address], and they are telling him they do not see the order in [address] [place] [address] Rd. The order in [address] [place] [address] Rd has the patient going to the [facility].     Please place new order with [employer] [employer] [employer] location,

## 2018-12-04 ENCOUNTER — MED REC SCAN ONLY (OUTPATIENT)
Dept: INTERNAL MEDICINE CLINIC | Facility: CLINIC | Age: 63
End: 2018-12-04

## 2018-12-05 ENCOUNTER — TELEPHONE (OUTPATIENT)
Dept: INTERNAL MEDICINE CLINIC | Facility: CLINIC | Age: 63
End: 2018-12-05

## 2018-12-05 NOTE — TELEPHONE ENCOUNTER
Patient had sleep study originally scheduled for this Monday Dec 10th, but just received a call stating they would have to cancel the appointment due to insurance not covering it.  He asks that a nurse look into and make any appropriate changes to codes so

## 2018-12-05 NOTE — TELEPHONE ENCOUNTER
Notes per referral department: [facility] generally requires Home Sleep study done FIRST before authorizing in lab sleep study. Note    12/5/18: in lab titration study \"denied\". Lack of medical necessity, peer to peer can be done within 21 days.  APAP does not

## 2018-12-05 NOTE — TELEPHONE ENCOUNTER
Patient is calling to ask Dr. Dona Proctor to schedule a peer to peer review regarding the sleep study that was denied by [facility]. The number to call is [phone], and the VNRV#[id number].     The patient needs this done ASAP as he has asked the sleep study to res

## 2018-12-07 NOTE — TELEPHONE ENCOUNTER
Spoke with Escobar Vasques at [facility]. There is no such thing as a CPAP Titration home study. Can provide a home sleep study only. Has previously discussed this with patient.  Recommending patient see a sleep  at [facility] lung or [facility]/

## 2018-12-07 NOTE — TELEPHONE ENCOUNTER
Left message at [facility] requesting return call. Do they provide in house CPAP titration study due to insurance denying it performed at a facility but will cover for home titration.

## 2018-12-10 ENCOUNTER — TELEPHONE (OUTPATIENT)
Dept: INTERNAL MEDICINE CLINIC | Facility: CLINIC | Age: 63
End: 2018-12-10

## 2018-12-10 DIAGNOSIS — G47.33 OSA (OBSTRUCTIVE SLEEP APNEA): Primary | ICD-10-CM

## 2018-12-10 NOTE — TELEPHONE ENCOUNTER
PC to clarify insurance denial of in lab sleep study titration despite has been on Cpap for 15 yrs. Confirmed with both  Halley Andrew, home Cpap titration is not an option. His insurance requires him to do home sleep study.

## 2018-12-13 NOTE — TELEPHONE ENCOUNTER
PC from pt   Pt called Katrina - talked to [facility] from appeals     Home Sleep Study can be used to do titration for pt. Dr. Montana Frost- please consider doing a script for a Home Sleep Study to determine titration?      Respond - neither Noemi Rodriguez nor

## 2018-12-13 NOTE — TELEPHONE ENCOUNTER
PC to pt  Referrals and script processed for home sleep study and general sleep study  Referral processed for Dr. Paula Sam - sleep specialist    Phone numbers for both given to pt who will do the f/u phone calls, copies of referrals put into mail for pt

## 2018-12-14 ENCOUNTER — PRIOR ORIGINAL RECORDS (OUTPATIENT)
Dept: OTHER | Age: 63
End: 2018-12-14

## 2018-12-18 ENCOUNTER — MED REC SCAN ONLY (OUTPATIENT)
Dept: INTERNAL MEDICINE CLINIC | Facility: CLINIC | Age: 63
End: 2018-12-18

## 2018-12-20 ENCOUNTER — TELEPHONE (OUTPATIENT)
Dept: INTERNAL MEDICINE CLINIC | Facility: CLINIC | Age: 63
End: 2018-12-20

## 2018-12-20 ENCOUNTER — MED REC SCAN ONLY (OUTPATIENT)
Dept: INTERNAL MEDICINE CLINIC | Facility: CLINIC | Age: 63
End: 2018-12-20

## 2018-12-20 NOTE — TELEPHONE ENCOUNTER
A benefit advocate from [facility] called just wanting to inform our office that patient's sleep study has been approved by them with the the approval date of 12/13/18. No call back needed, just wanted to let us know.

## 2018-12-20 NOTE — TELEPHONE ENCOUNTER
PC to  Pt     Inpatient titration sleep study now scheduled for 12/26/18 with insurance companies approval.

## 2018-12-26 ENCOUNTER — OFFICE VISIT (OUTPATIENT)
Dept: SLEEP CENTER | Facility: HOSPITAL | Age: 63
End: 2018-12-26
Attending: INTERNAL MEDICINE
Payer: COMMERCIAL

## 2018-12-26 DIAGNOSIS — G47.33 OSA (OBSTRUCTIVE SLEEP APNEA): ICD-10-CM

## 2018-12-26 PROCEDURE — 95811 POLYSOM 6/>YRS CPAP 4/> PARM: CPT

## 2018-12-27 ENCOUNTER — PATIENT MESSAGE (OUTPATIENT)
Dept: INTERNAL MEDICINE CLINIC | Facility: CLINIC | Age: 63
End: 2018-12-27

## 2018-12-27 ENCOUNTER — PRIOR ORIGINAL RECORDS (OUTPATIENT)
Dept: OTHER | Age: 63
End: 2018-12-27

## 2018-12-27 ENCOUNTER — TELEPHONE (OUTPATIENT)
Dept: INTERNAL MEDICINE CLINIC | Facility: CLINIC | Age: 63
End: 2018-12-27

## 2018-12-27 ENCOUNTER — LAB ENCOUNTER (OUTPATIENT)
Dept: LAB | Age: 63
End: 2018-12-27
Attending: INTERNAL MEDICINE
Payer: COMMERCIAL

## 2018-12-27 DIAGNOSIS — Z95.2 HEART VALVE REPLACED BY TRANSPLANT: ICD-10-CM

## 2018-12-27 DIAGNOSIS — R53.83 FATIGUE: Primary | ICD-10-CM

## 2018-12-27 DIAGNOSIS — R73.03 PREDIABETES: ICD-10-CM

## 2018-12-27 DIAGNOSIS — E78.00 PURE HYPERCHOLESTEROLEMIA: ICD-10-CM

## 2018-12-27 PROCEDURE — 36415 COLL VENOUS BLD VENIPUNCTURE: CPT

## 2018-12-27 PROCEDURE — 83036 HEMOGLOBIN GLYCOSYLATED A1C: CPT

## 2018-12-27 PROCEDURE — 83880 ASSAY OF NATRIURETIC PEPTIDE: CPT

## 2018-12-27 PROCEDURE — 85025 COMPLETE CBC W/AUTO DIFF WBC: CPT

## 2018-12-27 PROCEDURE — 80053 COMPREHEN METABOLIC PANEL: CPT

## 2018-12-27 PROCEDURE — 80061 LIPID PANEL: CPT

## 2018-12-27 NOTE — TELEPHONE ENCOUNTER
From: Addison Fonseca  To: Dharmesh Thapa MD  Sent: 12/27/2018 7:46 AM CST  Subject: Other    I had my blood drawn in the lab downstairs from you this morning. I am wondering if you could add the PSA test to the blood draw?  The tech said that if you call

## 2018-12-27 NOTE — TELEPHONE ENCOUNTER
From: Nicole Dugan  To: Atul España MD  Sent: 12/27/2018 8:41 AM CST  Subject: Other    [id number] Keep in mind then that I just had blood work done for Dr Chacha Westbrook that is shared with Dr Linda Willingham.  Please be sure that we dont reorder tests that will not be

## 2018-12-27 NOTE — TELEPHONE ENCOUNTER
PC re lab results reviewed by PCP and elevated FBS. Pt had already spoke to Dr. Tia Zamarripa re labs.

## 2018-12-27 NOTE — TELEPHONE ENCOUNTER
----- Message from Callum Dinero MD sent at 12/27/2018  8:24 AM CST -----  CBC and lipids good, concern is over . If A1C elevated also may need to start diabetic pill metformin. Tell him these # and I will call him back tomorrow.

## 2018-12-28 ENCOUNTER — TELEPHONE (OUTPATIENT)
Dept: INTERNAL MEDICINE CLINIC | Facility: CLINIC | Age: 63
End: 2018-12-28

## 2018-12-28 NOTE — TELEPHONE ENCOUNTER
Patient called and asks for a refill on the following rx:    ACCU-CHEK FASTCLIX LANCETS   and testing strips as well

## 2018-12-30 NOTE — PROCEDURES
[address]       Accredited by the [facility] ([facility])    PATIENT'S NAME:        Sterling Ruiz  ATTENDING PHYSICIAN:   Desean Aaron M.D. REFERRING PHYSICIAN:   Jo-Ann Ramirez M.D.   P saturation remained greater than 92%. ECG:  ECG demonstrated sinus rhythm throughout. PERIODIC LIMB MOVEMENTS:  The periodic limb movement index was 29.4, and the periodic limb movement with arousal index was 0.8.        EEG:  With the limited montag

## 2018-12-31 ENCOUNTER — PATIENT MESSAGE (OUTPATIENT)
Dept: INTERNAL MEDICINE CLINIC | Facility: CLINIC | Age: 63
End: 2018-12-31

## 2018-12-31 DIAGNOSIS — R73.03 PREDIABETES: Primary | ICD-10-CM

## 2018-12-31 RX ORDER — BLOOD-GLUCOSE METER
1 EACH MISCELLANEOUS 2 TIMES DAILY
Qty: 1 KIT | Refills: 0 | Status: SHIPPED | OUTPATIENT
Start: 2018-12-31 | End: 2019-12-31

## 2018-12-31 RX ORDER — LANCETS
EACH MISCELLANEOUS
Qty: 102 EACH | Refills: 3 | Status: SHIPPED | OUTPATIENT
Start: 2018-12-31 | End: 2020-02-26

## 2018-12-31 NOTE — TELEPHONE ENCOUNTER
From: Siobhan Frank  To: Elan Williamson MD  Sent: 12/31/2018 7:30 AM CST  Subject: Prescription Question    Can you please send a script to [facility] on [address] in [address] [place] [address] for:    1. AccuCheck Lancets? 90 days I requested this last week.  Its p

## 2019-01-04 ENCOUNTER — PRIOR ORIGINAL RECORDS (OUTPATIENT)
Dept: OTHER | Age: 64
End: 2019-01-04

## 2019-01-04 LAB
ALBUMIN: 3.9 G/DL
ALKALINE PHOSPHATATE(ALK PHOS): 62 IU/L
BILIRUBIN TOTAL: 0.6 MG/DL
BNP: 98 PMOL/L
BUN: 17 MG/DL
CALCIUM: 8.6 MG/DL
CHLORIDE: 109 MEQ/L
CHOLESTEROL, TOTAL: 131 MG/DL
CREATININE, SERUM: 0.95 MG/DL
GLOBULIN: 3.1 G/DL
GLUCOSE: 130 MG/DL
HDL CHOLESTEROL: 55 MG/DL
HEMATOCRIT: 42.3 %
HEMOGLOBIN: 13.5 G/DL
LDL CHOLESTEROL: 63 MG/DL
NON-HDL CHOLESTEROL: 76 MG/DL
PLATELETS: 217 K/UL
POTASSIUM, SERUM: 3.7 MEQ/L
PROTEIN, TOTAL: 7 G/DL
RED BLOOD COUNT: 5.09 X 10-6/U
SGOT (AST): 23 IU/L
SGPT (ALT): 38 IU/L
SODIUM: 141 MEQ/L
TRIGLYCERIDES: 63 MG/DL
WHITE BLOOD COUNT: 7.3 X 10-3/U

## 2019-01-09 ENCOUNTER — NURSE ONLY (OUTPATIENT)
Dept: INTERNAL MEDICINE CLINIC | Facility: CLINIC | Age: 64
End: 2019-01-09

## 2019-01-09 DIAGNOSIS — Z00.00 ROUTINE GENERAL MEDICAL EXAMINATION AT A HEALTH CARE FACILITY: Primary | ICD-10-CM

## 2019-01-09 LAB
BILIRUB UR QL STRIP.AUTO: NEGATIVE
CLARITY UR REFRACT.AUTO: CLEAR
COLOR UR AUTO: YELLOW
GLUCOSE UR STRIP.AUTO-MCNC: NEGATIVE MG/DL
HYALINE CASTS #/AREA URNS AUTO: PRESENT /LPF
KETONES UR STRIP.AUTO-MCNC: NEGATIVE MG/DL
LEUKOCYTE ESTERASE UR QL STRIP.AUTO: NEGATIVE
NITRITE UR QL STRIP.AUTO: NEGATIVE
PH UR STRIP.AUTO: 6 [PH] (ref 4.5–8)
PROT UR STRIP.AUTO-MCNC: NEGATIVE MG/DL
RBC #/AREA URNS AUTO: >10 /HPF
SP GR UR STRIP.AUTO: 1.02 (ref 1–1.03)
UROBILINOGEN UR STRIP.AUTO-MCNC: <2 MG/DL

## 2019-01-09 PROCEDURE — 92551 PURE TONE HEARING TEST AIR: CPT | Performed by: INTERNAL MEDICINE

## 2019-01-09 PROCEDURE — 94010 BREATHING CAPACITY TEST: CPT | Performed by: INTERNAL MEDICINE

## 2019-01-09 PROCEDURE — 99173 VISUAL ACUITY SCREEN: CPT | Performed by: INTERNAL MEDICINE

## 2019-01-09 PROCEDURE — 81001 URINALYSIS AUTO W/SCOPE: CPT | Performed by: INTERNAL MEDICINE

## 2019-01-09 PROCEDURE — 93000 ELECTROCARDIOGRAM COMPLETE: CPT | Performed by: INTERNAL MEDICINE

## 2019-01-09 NOTE — PROGRESS NOTES
*LOOKIN' BODY RESULTS    YES:  x     NO:       REASON TEST NOT PERFORMED:        HEIGHT: 5'10  WEIGHT:203  _____________________________________________________________________________  *VENIPUNCTURE    YES:  x     NO:        REASON VENIPUNCTURE NOT PERFOR

## 2019-01-10 LAB
ALT (SGPT): 38 U/L
AST (SGOT): 23 U/L
CHOLESTEROL, TOTAL: 131 MG/DL
GLUCOSE: 130 MG/DL
HDL CHOLESTEROL: 55 MG/DL
HEMOGLOBIN A1C: 6.4 %
LDL CHOLESTEROL: 63 MG/DL
NON-HDL CHOLESTEROL: 76 MG/DL
TRIGLYCERIDES: 63 MG/DL

## 2019-01-15 LAB
AMB EXT CHOLESTEROL, TOTAL: 119 MG/DL
AMB EXT HDL CHOLESTEROL: 58 MG/DL
AMB EXT HEMATOCRIT: 41.2
AMB EXT HEMOGLOBIN: 13.4
AMB EXT HGBA1C: 6.4 %
AMB EXT LDL CHOLESTEROL, DIRECT: 50 MG/DL
AMB EXT MCV: 81.6
AMB EXT PLATELETS: 214
AMB EXT PSA SCREEN: 1.51 NG/ML
AMB EXT TRIGLYCERIDES: 53 MG/DL
AMB EXT TSH: 2.03 MIU/ML
AMB EXT WBC: 4.6 X10(3)UL

## 2019-01-16 ENCOUNTER — MED REC SCAN ONLY (OUTPATIENT)
Dept: INTERNAL MEDICINE CLINIC | Facility: CLINIC | Age: 64
End: 2019-01-16

## 2019-01-17 ENCOUNTER — TELEPHONE (OUTPATIENT)
Dept: INTERNAL MEDICINE CLINIC | Facility: CLINIC | Age: 64
End: 2019-01-17

## 2019-01-17 NOTE — TELEPHONE ENCOUNTER
Reviewed urinalysis tet results  with patient. Discussed reasons why patient may have small amt of blood in his urine.  He verbalized understanding and will discuss future options with patient at upcoming [address] in Feb.       ----- Message from Libia Franklin MD s

## 2019-01-23 ENCOUNTER — PATIENT MESSAGE (OUTPATIENT)
Dept: INTERNAL MEDICINE CLINIC | Facility: CLINIC | Age: 64
End: 2019-01-23

## 2019-01-23 NOTE — TELEPHONE ENCOUNTER
From: Kiel Mason  To: Janiya Parker MD  Sent: 1/23/2019 2:58 PM CST  Subject: Other    I have a previously scheduled appointment with urologist Dr Henrietta Arango on this friday.  Can you please:    1. share with him the results of my urine analysis that

## 2019-01-28 NOTE — PROGRESS NOTES
HEALTH MAINTENANCE:      Immunization History   Administered Date(s) Administered   • FLUZONE 3 Yrs+ Quad Prsv Free 0.5 ml (17063) 09/22/2016   • Influenza 10/05/2011, 11/07/2012, 11/18/2013, 11/03/2014, 10/20/2015, 10/14/2017, 09/25/2018   • Pneumococcal

## 2019-01-29 ENCOUNTER — PRIOR ORIGINAL RECORDS (OUTPATIENT)
Dept: OTHER | Age: 64
End: 2019-01-29

## 2019-01-29 ENCOUNTER — MYAURORA ACCOUNT LINK (OUTPATIENT)
Dept: OTHER | Age: 64
End: 2019-01-29

## 2019-01-29 ENCOUNTER — OFFICE VISIT (OUTPATIENT)
Dept: INTERNAL MEDICINE CLINIC | Facility: CLINIC | Age: 64
End: 2019-01-29
Payer: COMMERCIAL

## 2019-01-29 VITALS
DIASTOLIC BLOOD PRESSURE: 80 MMHG | TEMPERATURE: 98 F | WEIGHT: 203 LBS | HEART RATE: 60 BPM | HEIGHT: 70 IN | BODY MASS INDEX: 29.06 KG/M2 | SYSTOLIC BLOOD PRESSURE: 140 MMHG

## 2019-01-29 DIAGNOSIS — Z00.01 ENCOUNTER FOR GENERAL ADULT MEDICAL EXAMINATION WITH ABNORMAL FINDINGS: Primary | ICD-10-CM

## 2019-01-29 PROCEDURE — 99396 PREV VISIT EST AGE 40-64: CPT | Performed by: INTERNAL MEDICINE

## 2019-01-29 RX ORDER — LISINOPRIL 20 MG/1
20 TABLET ORAL DAILY
COMMUNITY
End: 2020-01-02

## 2019-01-30 NOTE — PROGRESS NOTES
HPI:    Patient ID: Cristhian Guajardo is a 61year old male here for CPE 1-:    No new problems-still with intermittent hematuria, saw :        Review of Systems   Constitutional: Negative.     HENT:        Tonsillectomy, old  facial scar by mouth. Take one 4000 units tablet daily per patient. Disp:  Rfl:    Metoprolol Succinate (TOPROL XL) 50 MG Oral Tablet SR 24 Hr Take 50 mg by mouth daily. Disp:  Rfl:    Simvastatin 10 MG Oral Tab Take 10 mg by mouth nightly.  Disp:  Rfl:    Aspirin 81 Bilateral knee swelling 1992    viral-old   • BPH with elevated PSA    • BPPV (benign paroxysmal positional vertigo) 3/2014   • Chickenpox child    measles, mumps   • Diverticulosis 1/12    colonoscopy-Rodri   • Dyslipidemia    • Essential hypertension    • Grandfather    • Arthritis Mother    • High Blood Pressure Mother    • Heart Disorder Mother         a Fib   • Other (Other) Mother    • Other (psoriasis) Mother    • Heart Disease Sister         TIAs/stent   • High Blood Pressure Sister    • Heart Disorde 10/05/2011, 11/07/2012, 11/18/2013, 11/03/2014, 10/20/2015, 10/14/2017, 09/25/2018   • Pneumococcal (Prevnar 13) 01/21/2015   • Pneumovax 23 03/27/2018   • TD 06/26/2002   • TDAP 11/26/2013   • Zoster Vaccine Live (Zostavax) 07/15/2015     HOLD ON NEW SHIN subtle finding in thalassemia trait    UA: Moderate then trace of blood on repeat  ______________________________________________________________________     EKG:NORMAL SINUS RHYTHM  POSSIBLE LEFT ATRIAL ENLARGEMENT     SPIROMETRY:LOW VITAL CAPACITY     HE hematuria also. Neg cysto 2013. PSA is low now on meds. Kidney cysts but NO kidney stones on recent ultrasound 10/2017, no need to repeat yet. No sign of colon cancer on colonoscopy 3/2017 good 5yrs-family hx colon CA.  No reason to have lung cancer being a

## 2019-02-01 ENCOUNTER — TELEPHONE (OUTPATIENT)
Dept: INTERNAL MEDICINE CLINIC | Facility: CLINIC | Age: 64
End: 2019-02-01

## 2019-02-01 DIAGNOSIS — R73.03 PREDIABETES: Primary | ICD-10-CM

## 2019-02-01 NOTE — TELEPHONE ENCOUNTER
F/u CPE labs    [facility] - HbA1C - 3 mos ( May) order processed    [facility]-  Omega, TMAO - 6 mos ( July)  DX CAD      PC to patient to inform above labs, LM with name on VM

## 2019-02-07 ENCOUNTER — PRIOR ORIGINAL RECORDS (OUTPATIENT)
Dept: OTHER | Age: 64
End: 2019-02-07

## 2019-02-07 ENCOUNTER — LAB ENCOUNTER (OUTPATIENT)
Dept: LAB | Age: 64
End: 2019-02-07
Attending: UROLOGY
Payer: COMMERCIAL

## 2019-02-07 DIAGNOSIS — R31.29 MICROHEMATURIA: ICD-10-CM

## 2019-02-07 DIAGNOSIS — R53.83 FATIGUE: Primary | ICD-10-CM

## 2019-02-07 DIAGNOSIS — R06.00 DYSPNEA: ICD-10-CM

## 2019-02-07 DIAGNOSIS — I15.8 OTHER SECONDARY HYPERTENSION: ICD-10-CM

## 2019-02-07 LAB
ANION GAP SERPL CALC-SCNC: 5 MMOL/L (ref 0–18)
BILIRUB UR QL STRIP.AUTO: NEGATIVE
BUN BLD-MCNC: 18 MG/DL (ref 8–20)
BUN/CREAT SERPL: 20.2 (ref 10–20)
CALCIUM BLD-MCNC: 9.1 MG/DL (ref 8.3–10.3)
CHLORIDE SERPL-SCNC: 107 MMOL/L (ref 101–111)
CLARITY UR REFRACT.AUTO: CLEAR
CO2 SERPL-SCNC: 28 MMOL/L (ref 22–32)
COLOR UR AUTO: YELLOW
CREAT BLD-MCNC: 0.89 MG/DL (ref 0.7–1.3)
GLUCOSE BLD-MCNC: 121 MG/DL (ref 70–99)
GLUCOSE UR STRIP.AUTO-MCNC: NEGATIVE MG/DL
KETONES UR STRIP.AUTO-MCNC: NEGATIVE MG/DL
LEUKOCYTE ESTERASE UR QL STRIP.AUTO: NEGATIVE
NITRITE UR QL STRIP.AUTO: NEGATIVE
OSMOLALITY SERPL CALC.SUM OF ELEC: 293 MOSM/KG (ref 275–295)
PH UR STRIP.AUTO: 5 [PH] (ref 4.5–8)
POTASSIUM SERPL-SCNC: 4.1 MMOL/L (ref 3.6–5.1)
PROT UR STRIP.AUTO-MCNC: NEGATIVE MG/DL
RBC UR QL AUTO: NEGATIVE
SODIUM SERPL-SCNC: 140 MMOL/L (ref 136–144)
SP GR UR STRIP.AUTO: 1.02 (ref 1–1.03)
UROBILINOGEN UR STRIP.AUTO-MCNC: <2 MG/DL

## 2019-02-07 PROCEDURE — 80048 BASIC METABOLIC PNL TOTAL CA: CPT

## 2019-02-07 PROCEDURE — 81003 URINALYSIS AUTO W/O SCOPE: CPT

## 2019-02-07 PROCEDURE — 36415 COLL VENOUS BLD VENIPUNCTURE: CPT

## 2019-02-08 LAB
BUN: 18 MG/DL
CALCIUM: 9.1 MG/DL
CHLORIDE: 107 MEQ/L
CREATININE, SERUM: 0.89 MG/DL
GLUCOSE: 121 MG/DL
POTASSIUM, SERUM: 4.1 MEQ/L
SODIUM: 140 MEQ/L

## 2019-02-20 ENCOUNTER — TELEPHONE (OUTPATIENT)
Dept: INTERNAL MEDICINE CLINIC | Facility: CLINIC | Age: 64
End: 2019-02-20

## 2019-02-20 RX ORDER — AMOXICILLIN 500 MG/1
500 CAPSULE ORAL 2 TIMES DAILY
Qty: 14 CAPSULE | Refills: 0 | Status: SHIPPED | OUTPATIENT
Start: 2019-02-20 | End: 2019-02-27

## 2019-02-20 NOTE — TELEPHONE ENCOUNTER
Patient feels he has a sinus infection and would like to speak to the nurse.   Offered OV for today, but he wants to talk to the nurse first.  Please call

## 2019-02-20 NOTE — TELEPHONE ENCOUNTER
Per Dr. Mansoor Terrell: Amox 500mg po BID x7 days #14. Patient notified, verbalized understanding. Pharmacy location confirmed with patient and order placed.

## 2019-02-25 ENCOUNTER — TELEPHONE (OUTPATIENT)
Dept: INTERNAL MEDICINE CLINIC | Facility: CLINIC | Age: 64
End: 2019-02-25

## 2019-02-25 NOTE — TELEPHONE ENCOUNTER
CIERA and told him unfortulately unable to give medical advice regarding his wife as she is not our patient. He said no problem as he decided to take her to the ER and they are there now.

## 2019-02-25 NOTE — TELEPHONE ENCOUNTER
Patient called and says his wife, Julia Guan ( who is not our patient), has started with a complete body rash. He believes she is having some kind of allergic reaction and he would just like some advice.

## 2019-02-26 ENCOUNTER — MED REC SCAN ONLY (OUTPATIENT)
Dept: INTERNAL MEDICINE CLINIC | Facility: CLINIC | Age: 64
End: 2019-02-26

## 2019-02-28 VITALS
DIASTOLIC BLOOD PRESSURE: 70 MMHG | WEIGHT: 201 LBS | SYSTOLIC BLOOD PRESSURE: 134 MMHG | HEART RATE: 70 BPM | BODY MASS INDEX: 28.14 KG/M2 | HEIGHT: 71 IN

## 2019-02-28 VITALS
HEIGHT: 71 IN | DIASTOLIC BLOOD PRESSURE: 80 MMHG | SYSTOLIC BLOOD PRESSURE: 138 MMHG | BODY MASS INDEX: 28.56 KG/M2 | RESPIRATION RATE: 20 BRPM | WEIGHT: 204 LBS | HEART RATE: 72 BPM

## 2019-02-28 VITALS
HEIGHT: 71 IN | WEIGHT: 195 LBS | HEART RATE: 80 BPM | DIASTOLIC BLOOD PRESSURE: 68 MMHG | BODY MASS INDEX: 27.3 KG/M2 | SYSTOLIC BLOOD PRESSURE: 140 MMHG

## 2019-02-28 VITALS
SYSTOLIC BLOOD PRESSURE: 130 MMHG | HEIGHT: 71 IN | BODY MASS INDEX: 28.7 KG/M2 | HEART RATE: 68 BPM | WEIGHT: 205 LBS | DIASTOLIC BLOOD PRESSURE: 80 MMHG

## 2019-03-01 VITALS
HEIGHT: 71 IN | SYSTOLIC BLOOD PRESSURE: 134 MMHG | WEIGHT: 210 LBS | BODY MASS INDEX: 29.4 KG/M2 | HEART RATE: 60 BPM | DIASTOLIC BLOOD PRESSURE: 64 MMHG

## 2019-03-25 RX ORDER — LISINOPRIL 20 MG/1
TABLET ORAL
COMMUNITY
Start: 2019-01-29 | End: 2019-05-06 | Stop reason: DRUGHIGH

## 2019-03-25 RX ORDER — DUTASTERIDE 0.5 MG/1
CAPSULE, LIQUID FILLED ORAL
COMMUNITY
Start: 2013-07-08

## 2019-03-25 RX ORDER — METOPROLOL SUCCINATE 50 MG/1
TABLET, EXTENDED RELEASE ORAL
COMMUNITY
Start: 2018-12-27 | End: 2019-05-06 | Stop reason: SDUPTHER

## 2019-03-25 RX ORDER — SIMVASTATIN 10 MG
TABLET ORAL
COMMUNITY
Start: 2018-12-27 | End: 2019-05-06 | Stop reason: SDUPTHER

## 2019-03-25 RX ORDER — SILODOSIN 8 MG/1
CAPSULE ORAL
COMMUNITY
Start: 2015-03-23

## 2019-03-27 DIAGNOSIS — I50.30 DIASTOLIC HEART FAILURE, UNSPECIFIED HF CHRONICITY (CMD): Primary | ICD-10-CM

## 2019-03-27 DIAGNOSIS — I27.20 PULMONARY HTN (CMD): ICD-10-CM

## 2019-04-11 ENCOUNTER — HOSPITAL ENCOUNTER (OUTPATIENT)
Dept: CV DIAGNOSTICS | Facility: HOSPITAL | Age: 64
Discharge: HOME | End: 2019-04-11
Attending: INTERNAL MEDICINE
Payer: COMMERCIAL

## 2019-04-11 DIAGNOSIS — I50.30 DIASTOLIC HEART FAILURE, UNSPECIFIED HF CHRONICITY (HCC): ICD-10-CM

## 2019-04-11 DIAGNOSIS — I27.20 PULMONARY HTN (HCC): ICD-10-CM

## 2019-04-11 PROCEDURE — 93306 TTE W/DOPPLER COMPLETE: CPT | Performed by: INTERNAL MEDICINE

## 2019-05-06 ENCOUNTER — OFFICE VISIT (OUTPATIENT)
Dept: CARDIOLOGY | Age: 64
End: 2019-05-06

## 2019-05-06 VITALS
WEIGHT: 204 LBS | HEART RATE: 75 BPM | HEIGHT: 71 IN | BODY MASS INDEX: 28.56 KG/M2 | DIASTOLIC BLOOD PRESSURE: 74 MMHG | SYSTOLIC BLOOD PRESSURE: 146 MMHG

## 2019-05-06 DIAGNOSIS — R06.09 DYSPNEA ON EXERTION: Primary | ICD-10-CM

## 2019-05-06 DIAGNOSIS — G47.30 SLEEP APNEA, UNSPECIFIED TYPE: ICD-10-CM

## 2019-05-06 DIAGNOSIS — R61 DIAPHORESIS: ICD-10-CM

## 2019-05-06 DIAGNOSIS — I27.21 PULMONARY HYPERTENSION SECONDARY TO INCREASED PVR (CMD): ICD-10-CM

## 2019-05-06 DIAGNOSIS — Z95.2 H/O PROSTHETIC MITRAL VALVE: ICD-10-CM

## 2019-05-06 DIAGNOSIS — E78.00 PURE HYPERCHOLESTEROLEMIA: ICD-10-CM

## 2019-05-06 DIAGNOSIS — R42 DIZZINESS: ICD-10-CM

## 2019-05-06 DIAGNOSIS — R53.81 MALAISE AND FATIGUE: ICD-10-CM

## 2019-05-06 DIAGNOSIS — R53.83 MALAISE AND FATIGUE: ICD-10-CM

## 2019-05-06 DIAGNOSIS — I34.1 MITRAL VALVE PROLAPSE: ICD-10-CM

## 2019-05-06 DIAGNOSIS — E55.9 VITAMIN D DEFICIENCY: ICD-10-CM

## 2019-05-06 DIAGNOSIS — I34.0 MITRAL VALVE INSUFFICIENCY, UNSPECIFIED ETIOLOGY: ICD-10-CM

## 2019-05-06 PROCEDURE — 3077F SYST BP >= 140 MM HG: CPT | Performed by: INTERNAL MEDICINE

## 2019-05-06 PROCEDURE — 3078F DIAST BP <80 MM HG: CPT | Performed by: INTERNAL MEDICINE

## 2019-05-06 PROCEDURE — 99214 OFFICE O/P EST MOD 30 MIN: CPT | Performed by: INTERNAL MEDICINE

## 2019-05-06 RX ORDER — LISINOPRIL 20 MG/1
TABLET ORAL
Qty: 135 TABLET | Refills: 1 | Status: SHIPPED | OUTPATIENT
Start: 2019-05-06 | End: 2019-12-11 | Stop reason: ALTCHOICE

## 2019-05-06 RX ORDER — LISINOPRIL 20 MG/1
20 TABLET ORAL DAILY
Qty: 90 TABLET | Refills: 1 | Status: CANCELLED | OUTPATIENT
Start: 2019-05-06

## 2019-05-06 RX ORDER — METOPROLOL SUCCINATE 50 MG/1
50 TABLET, EXTENDED RELEASE ORAL DAILY
Qty: 90 TABLET | Refills: 1 | Status: SHIPPED | OUTPATIENT
Start: 2019-05-06 | End: 2019-12-08 | Stop reason: SDUPTHER

## 2019-05-06 RX ORDER — SIMVASTATIN 10 MG
10 TABLET ORAL AT BEDTIME
Qty: 90 TABLET | Refills: 1 | Status: SHIPPED | OUTPATIENT
Start: 2019-05-06 | End: 2019-12-11 | Stop reason: SDUPTHER

## 2019-05-06 RX ORDER — LISINOPRIL 20 MG/1
20 TABLET ORAL DAILY
COMMUNITY
End: 2019-05-06 | Stop reason: SDUPTHER

## 2019-05-06 SDOH — HEALTH STABILITY: PHYSICAL HEALTH: ON AVERAGE, HOW MANY DAYS PER WEEK DO YOU ENGAGE IN MODERATE TO STRENUOUS EXERCISE (LIKE A BRISK WALK)?: 3 DAYS

## 2019-05-06 SDOH — HEALTH STABILITY: PHYSICAL HEALTH: ON AVERAGE, HOW MANY MINUTES DO YOU ENGAGE IN EXERCISE AT THIS LEVEL?: 150+ MIN

## 2019-05-07 ENCOUNTER — TELEPHONE (OUTPATIENT)
Dept: CARDIOLOGY | Age: 64
End: 2019-05-07

## 2019-05-07 ENCOUNTER — OFFICE VISIT (OUTPATIENT)
Dept: CARDIOLOGY | Age: 64
End: 2019-05-07

## 2019-05-07 ENCOUNTER — E-ADVICE (OUTPATIENT)
Dept: CARDIOLOGY | Age: 64
End: 2019-05-07

## 2019-05-07 VITALS
RESPIRATION RATE: 20 BRPM | WEIGHT: 204 LBS | HEIGHT: 71 IN | DIASTOLIC BLOOD PRESSURE: 80 MMHG | HEART RATE: 60 BPM | SYSTOLIC BLOOD PRESSURE: 179 MMHG | BODY MASS INDEX: 28.56 KG/M2

## 2019-05-07 DIAGNOSIS — I10 ESSENTIAL HYPERTENSION: ICD-10-CM

## 2019-05-07 DIAGNOSIS — I27.21 SECONDARY PULMONARY ARTERIAL HYPERTENSION (CMD): Primary | ICD-10-CM

## 2019-05-07 PROCEDURE — 3077F SYST BP >= 140 MM HG: CPT | Performed by: INTERNAL MEDICINE

## 2019-05-07 PROCEDURE — 99213 OFFICE O/P EST LOW 20 MIN: CPT | Performed by: INTERNAL MEDICINE

## 2019-05-07 PROCEDURE — 3079F DIAST BP 80-89 MM HG: CPT | Performed by: INTERNAL MEDICINE

## 2019-05-07 ASSESSMENT — ENCOUNTER SYMPTOMS
WEIGHT GAIN: 0
HEMATOCHEZIA: 0
CHILLS: 0
DIZZINESS: 1
SUSPICIOUS LESIONS: 0
BRUISES/BLEEDS EASILY: 0
WEIGHT LOSS: 0
FEVER: 0
ALLERGIC/IMMUNOLOGIC COMMENTS: NO NEW FOOD ALLERGIES
HEMOPTYSIS: 0
COUGH: 0

## 2019-05-07 ASSESSMENT — PATIENT HEALTH QUESTIONNAIRE - PHQ9
2. FEELING DOWN, DEPRESSED OR HOPELESS: NOT AT ALL
SUM OF ALL RESPONSES TO PHQ9 QUESTIONS 1 AND 2: 0
1. LITTLE INTEREST OR PLEASURE IN DOING THINGS: NOT AT ALL
SUM OF ALL RESPONSES TO PHQ9 QUESTIONS 1 AND 2: 0

## 2019-05-08 ENCOUNTER — MED REC SCAN ONLY (OUTPATIENT)
Dept: INTERNAL MEDICINE CLINIC | Facility: CLINIC | Age: 64
End: 2019-05-08

## 2019-05-09 ENCOUNTER — MED REC SCAN ONLY (OUTPATIENT)
Dept: INTERNAL MEDICINE CLINIC | Facility: CLINIC | Age: 64
End: 2019-05-09

## 2019-06-17 ENCOUNTER — TELEPHONE (OUTPATIENT)
Dept: INTERNAL MEDICINE CLINIC | Facility: CLINIC | Age: 64
End: 2019-06-17

## 2019-06-17 NOTE — TELEPHONE ENCOUNTER
Letter mailed to patient explaining he has labs that are due in July. Omega check  TMAO    To be done through [facility] Dx CAD     Instructions as follows:  1. Fasting   2.  Book appt for lab in our office, but patient can go directly to [facility] day of

## 2019-10-16 ENCOUNTER — TELEPHONE (OUTPATIENT)
Dept: INTERNAL MEDICINE CLINIC | Facility: CLINIC | Age: 64
End: 2019-10-16

## 2019-11-11 ENCOUNTER — OFFICE VISIT (OUTPATIENT)
Dept: CARDIOLOGY | Age: 64
End: 2019-11-11

## 2019-11-11 VITALS
HEART RATE: 66 BPM | WEIGHT: 206 LBS | BODY MASS INDEX: 28.84 KG/M2 | SYSTOLIC BLOOD PRESSURE: 168 MMHG | DIASTOLIC BLOOD PRESSURE: 89 MMHG | HEIGHT: 71 IN

## 2019-11-11 DIAGNOSIS — R53.83 MALAISE AND FATIGUE: ICD-10-CM

## 2019-11-11 DIAGNOSIS — E78.00 PURE HYPERCHOLESTEROLEMIA: ICD-10-CM

## 2019-11-11 DIAGNOSIS — Z95.2 H/O PROSTHETIC MITRAL VALVE: ICD-10-CM

## 2019-11-11 DIAGNOSIS — I34.0 MITRAL VALVE INSUFFICIENCY, UNSPECIFIED ETIOLOGY: ICD-10-CM

## 2019-11-11 DIAGNOSIS — R53.81 MALAISE AND FATIGUE: ICD-10-CM

## 2019-11-11 DIAGNOSIS — G47.30 SLEEP APNEA, UNSPECIFIED TYPE: ICD-10-CM

## 2019-11-11 DIAGNOSIS — E55.9 VITAMIN D DEFICIENCY: ICD-10-CM

## 2019-11-11 DIAGNOSIS — R61 DIAPHORESIS: ICD-10-CM

## 2019-11-11 DIAGNOSIS — R06.09 DYSPNEA ON EXERTION: Primary | ICD-10-CM

## 2019-11-11 DIAGNOSIS — R00.2 PALPITATIONS: ICD-10-CM

## 2019-11-11 DIAGNOSIS — I27.21 PULMONARY HYPERTENSION SECONDARY TO INCREASED PVR (CMD): ICD-10-CM

## 2019-11-11 DIAGNOSIS — R42 DIZZINESS: ICD-10-CM

## 2019-11-11 DIAGNOSIS — I34.1 MITRAL VALVE PROLAPSE: ICD-10-CM

## 2019-11-11 PROCEDURE — 99214 OFFICE O/P EST MOD 30 MIN: CPT | Performed by: INTERNAL MEDICINE

## 2019-11-11 SDOH — HEALTH STABILITY: PHYSICAL HEALTH: ON AVERAGE, HOW MANY DAYS PER WEEK DO YOU ENGAGE IN MODERATE TO STRENUOUS EXERCISE (LIKE A BRISK WALK)?: 3 DAYS

## 2019-11-11 SDOH — HEALTH STABILITY: PHYSICAL HEALTH: ON AVERAGE, HOW MANY MINUTES DO YOU ENGAGE IN EXERCISE AT THIS LEVEL?: 150+ MIN

## 2019-12-02 ENCOUNTER — TELEPHONE (OUTPATIENT)
Dept: CARDIOLOGY | Age: 64
End: 2019-12-02

## 2019-12-10 RX ORDER — METOPROLOL SUCCINATE 50 MG/1
50 TABLET, EXTENDED RELEASE ORAL DAILY
Qty: 90 TABLET | Refills: 3 | Status: SHIPPED | OUTPATIENT
Start: 2019-12-10 | End: 2019-12-19 | Stop reason: DRUGHIGH

## 2019-12-11 ENCOUNTER — TELEPHONE (OUTPATIENT)
Dept: CARDIOLOGY | Age: 64
End: 2019-12-11

## 2019-12-11 RX ORDER — SIMVASTATIN 10 MG
10 TABLET ORAL AT BEDTIME
Qty: 90 TABLET | Refills: 3 | Status: SHIPPED | OUTPATIENT
Start: 2019-12-11 | End: 2020-05-12

## 2019-12-11 RX ORDER — LISINOPRIL 40 MG/1
40 TABLET ORAL DAILY
Qty: 90 TABLET | Refills: 3 | Status: SHIPPED | OUTPATIENT
Start: 2019-12-11 | End: 2020-08-28

## 2019-12-17 ENCOUNTER — ANCILLARY PROCEDURE (OUTPATIENT)
Dept: CARDIOLOGY | Age: 64
End: 2019-12-17
Attending: INTERNAL MEDICINE

## 2019-12-17 DIAGNOSIS — Z95.2 H/O PROSTHETIC MITRAL VALVE: ICD-10-CM

## 2019-12-17 DIAGNOSIS — R06.09 DYSPNEA ON EXERTION: ICD-10-CM

## 2019-12-17 DIAGNOSIS — R00.2 PALPITATIONS: ICD-10-CM

## 2019-12-17 DIAGNOSIS — R42 DIZZINESS: ICD-10-CM

## 2019-12-17 DIAGNOSIS — E78.00 PURE HYPERCHOLESTEROLEMIA: ICD-10-CM

## 2019-12-17 DIAGNOSIS — I27.21 PULMONARY HYPERTENSION SECONDARY TO INCREASED PVR (CMD): ICD-10-CM

## 2019-12-17 PROCEDURE — X1094 NO CHARGE VISIT: HCPCS | Performed by: INTERNAL MEDICINE

## 2019-12-17 PROCEDURE — 93224 XTRNL ECG REC UP TO 48 HRS: CPT | Performed by: INTERNAL MEDICINE

## 2019-12-18 ENCOUNTER — TELEPHONE (OUTPATIENT)
Dept: CARDIOLOGY | Age: 64
End: 2019-12-18

## 2019-12-19 ENCOUNTER — TELEPHONE (OUTPATIENT)
Dept: CARDIOLOGY | Age: 64
End: 2019-12-19

## 2019-12-19 RX ORDER — METOPROLOL SUCCINATE 50 MG/1
50 TABLET, EXTENDED RELEASE ORAL 2 TIMES DAILY
Qty: 180 TABLET | Refills: 0 | Status: SHIPPED | OUTPATIENT
Start: 2019-12-19 | End: 2020-02-14 | Stop reason: DRUGHIGH

## 2019-12-23 ENCOUNTER — OFFICE VISIT (OUTPATIENT)
Dept: CARDIOLOGY | Age: 64
End: 2019-12-23

## 2019-12-23 ENCOUNTER — TELEPHONE (OUTPATIENT)
Dept: CARDIOLOGY | Age: 64
End: 2019-12-23

## 2019-12-23 VITALS
HEART RATE: 67 BPM | SYSTOLIC BLOOD PRESSURE: 130 MMHG | BODY MASS INDEX: 28 KG/M2 | WEIGHT: 200 LBS | HEIGHT: 71 IN | DIASTOLIC BLOOD PRESSURE: 80 MMHG

## 2019-12-23 DIAGNOSIS — R06.09 DYSPNEA ON EXERTION: Primary | ICD-10-CM

## 2019-12-23 DIAGNOSIS — R00.2 PALPITATIONS: ICD-10-CM

## 2019-12-23 DIAGNOSIS — I48.91 ATRIAL FIBRILLATION, UNSPECIFIED TYPE (CMD): ICD-10-CM

## 2019-12-23 PROCEDURE — 99205 OFFICE O/P NEW HI 60 MIN: CPT | Performed by: INTERNAL MEDICINE

## 2019-12-23 ASSESSMENT — PATIENT HEALTH QUESTIONNAIRE - PHQ9
SUM OF ALL RESPONSES TO PHQ9 QUESTIONS 1 AND 2: 0
2. FEELING DOWN, DEPRESSED OR HOPELESS: NOT AT ALL
SUM OF ALL RESPONSES TO PHQ9 QUESTIONS 1 AND 2: 0
1. LITTLE INTEREST OR PLEASURE IN DOING THINGS: NOT AT ALL

## 2019-12-26 ENCOUNTER — TELEPHONE (OUTPATIENT)
Dept: CARDIOLOGY | Age: 64
End: 2019-12-26

## 2019-12-29 ENCOUNTER — PATIENT MESSAGE (OUTPATIENT)
Dept: INTERNAL MEDICINE CLINIC | Facility: CLINIC | Age: 64
End: 2019-12-29

## 2019-12-30 ENCOUNTER — NURSE ONLY (OUTPATIENT)
Dept: INTERNAL MEDICINE CLINIC | Facility: CLINIC | Age: 64
End: 2019-12-30

## 2019-12-30 VITALS — WEIGHT: 204.5 LBS | HEIGHT: 70 IN | BODY MASS INDEX: 29.28 KG/M2

## 2019-12-30 DIAGNOSIS — Z00.00 ROUTINE GENERAL MEDICAL EXAMINATION AT A HEALTH CARE FACILITY: Primary | ICD-10-CM

## 2019-12-30 DIAGNOSIS — R73.03 PREDIABETES: ICD-10-CM

## 2019-12-30 LAB
AMB EXT HGBA1C: 6.5 %
AMB EXT PSA SCREEN: 1.87 NG/ML

## 2019-12-30 PROCEDURE — 93000 ELECTROCARDIOGRAM COMPLETE: CPT | Performed by: INTERNAL MEDICINE

## 2019-12-30 PROCEDURE — 94010 BREATHING CAPACITY TEST: CPT | Performed by: INTERNAL MEDICINE

## 2019-12-30 PROCEDURE — 82043 UR ALBUMIN QUANTITATIVE: CPT | Performed by: INTERNAL MEDICINE

## 2019-12-30 PROCEDURE — 81001 URINALYSIS AUTO W/SCOPE: CPT | Performed by: INTERNAL MEDICINE

## 2019-12-30 PROCEDURE — 92551 PURE TONE HEARING TEST AIR: CPT | Performed by: INTERNAL MEDICINE

## 2019-12-30 PROCEDURE — 82570 ASSAY OF URINE CREATININE: CPT | Performed by: INTERNAL MEDICINE

## 2019-12-30 PROCEDURE — 99173 VISUAL ACUITY SCREEN: CPT | Performed by: INTERNAL MEDICINE

## 2019-12-30 NOTE — PROGRESS NOTES
*LOOKIN' BODY RESULTS    YES: x      NO:       REASON TEST NOT PERFORMED:        HEIGHT: 5'10  WEIGHT:204.5lb  _____________________________________________________________________________  *VENIPUNCTURE    YES:  x     NO:        REASON VENIPUNCTURE NOT PE

## 2019-12-31 ENCOUNTER — TELEPHONE (OUTPATIENT)
Dept: CARDIOLOGY | Age: 64
End: 2019-12-31

## 2019-12-31 ENCOUNTER — HOSPITAL ENCOUNTER (OUTPATIENT)
Dept: CV DIAGNOSTICS | Facility: HOSPITAL | Age: 64
Discharge: HOME | End: 2019-12-31
Attending: INTERNAL MEDICINE
Payer: COMMERCIAL

## 2019-12-31 DIAGNOSIS — I48.91 ATRIAL FIBRILLATION, UNSPECIFIED TYPE (HCC): ICD-10-CM

## 2019-12-31 DIAGNOSIS — R06.00 DOE (DYSPNEA ON EXERTION): ICD-10-CM

## 2019-12-31 DIAGNOSIS — R00.2 PALPITATIONS: ICD-10-CM

## 2019-12-31 NOTE — TELEPHONE ENCOUNTER
From: Miriam Angel  To: Karenann Kawasaki, MD  Sent: 12/29/2019 10:52 AM CST  Subject: Other    I already had my flu vaccine this year.

## 2019-12-31 NOTE — PROGRESS NOTES
Cardiac Diagnostic Note:    Pt here for stress echo. Reports reason for test is atrial fib. Pt also reports that he has not started his blood thinner. Dr Azra Covarrubias office notified.  Per Castro LOBO, stress test should not be done until he's taken blood thinner

## 2020-01-01 ENCOUNTER — EXTERNAL RECORD (OUTPATIENT)
Dept: HEALTH INFORMATION MANAGEMENT | Facility: OTHER | Age: 65
End: 2020-01-01

## 2020-01-02 ENCOUNTER — HOSPITAL ENCOUNTER (OUTPATIENT)
Dept: CV DIAGNOSTICS | Facility: HOSPITAL | Age: 65
Discharge: HOME | End: 2020-01-02
Attending: INTERNAL MEDICINE
Payer: COMMERCIAL

## 2020-01-02 ENCOUNTER — OFFICE VISIT (OUTPATIENT)
Dept: INTERNAL MEDICINE CLINIC | Facility: CLINIC | Age: 65
End: 2020-01-02
Payer: COMMERCIAL

## 2020-01-02 VITALS
SYSTOLIC BLOOD PRESSURE: 136 MMHG | RESPIRATION RATE: 16 BRPM | WEIGHT: 204 LBS | DIASTOLIC BLOOD PRESSURE: 74 MMHG | BODY MASS INDEX: 29.2 KG/M2 | HEIGHT: 70 IN | HEART RATE: 67 BPM | TEMPERATURE: 98 F | OXYGEN SATURATION: 98 %

## 2020-01-02 DIAGNOSIS — I48.0 PAF (PAROXYSMAL ATRIAL FIBRILLATION) (HCC): Primary | ICD-10-CM

## 2020-01-02 PROCEDURE — 93018 CV STRESS TEST I&R ONLY: CPT | Performed by: INTERNAL MEDICINE

## 2020-01-02 PROCEDURE — 93350 STRESS TTE ONLY: CPT | Performed by: INTERNAL MEDICINE

## 2020-01-02 PROCEDURE — 93017 CV STRESS TEST TRACING ONLY: CPT | Performed by: INTERNAL MEDICINE

## 2020-01-02 PROCEDURE — 99214 OFFICE O/P EST MOD 30 MIN: CPT | Performed by: INTERNAL MEDICINE

## 2020-01-02 RX ORDER — APIXABAN 5 MG/1
TABLET, FILM COATED ORAL 2 TIMES DAILY
COMMUNITY
Start: 2019-12-24 | End: 2020-04-14

## 2020-01-02 RX ORDER — CLINDAMYCIN HYDROCHLORIDE 300 MG/1
CAPSULE ORAL
COMMUNITY
Start: 2019-11-04

## 2020-01-02 RX ORDER — LISINOPRIL 10 MG/1
TABLET ORAL
COMMUNITY
Start: 2019-11-26 | End: 2020-01-02

## 2020-01-02 RX ORDER — METOPROLOL TARTRATE 50 MG/1
50 TABLET, FILM COATED ORAL 2 TIMES DAILY
Refills: 0 | COMMUNITY
Start: 2020-01-02 | End: 2020-02-04

## 2020-01-02 RX ORDER — METOPROLOL SUCCINATE AND HYDROCHLOROTHIAZIDE 12.5; 5 MG/1; MG/1
TABLET ORAL
COMMUNITY
Start: 2019-12-19 | End: 2020-01-02

## 2020-01-02 RX ORDER — BLOOD SUGAR DIAGNOSTIC
STRIP MISCELLANEOUS
Refills: 3 | COMMUNITY
Start: 2019-09-17 | End: 2020-02-26

## 2020-01-02 RX ORDER — LISINOPRIL 40 MG/1
TABLET ORAL
COMMUNITY
Start: 2019-12-11

## 2020-01-03 ENCOUNTER — TELEPHONE (OUTPATIENT)
Dept: INTERNAL MEDICINE CLINIC | Facility: CLINIC | Age: 65
End: 2020-01-03

## 2020-01-03 NOTE — PROGRESS NOTES
HPI:    Patient ID: Clara Knowles is a 59year old male recent dx PAF-also BP worse    Saw cardio ervin for inc BP 150s she inc Lisinopril up to 40 now. Saw sidney for PAF on Holter, started Eliquis and inc Metoprolol now tired and foggy.        Elijah daily.     • Simvastatin 10 MG Oral Tab Take 10 mg by mouth nightly. • Aspirin 81 MG Oral Tab Take 81 mg by mouth daily.        Allergies:  Flomax [Tamsulosin]     DIZZINESS  Aleve                   RASH  Levaquin [Levofloxa*    DIARRHEA  Pcn [Penicilli

## 2020-01-06 ENCOUNTER — PATIENT MESSAGE (OUTPATIENT)
Dept: INTERNAL MEDICINE CLINIC | Facility: CLINIC | Age: 65
End: 2020-01-06

## 2020-01-06 ENCOUNTER — TELEPHONE (OUTPATIENT)
Dept: INTERNAL MEDICINE CLINIC | Facility: CLINIC | Age: 65
End: 2020-01-06

## 2020-01-06 ENCOUNTER — MED REC SCAN ONLY (OUTPATIENT)
Dept: INTERNAL MEDICINE CLINIC | Facility: CLINIC | Age: 65
End: 2020-01-06

## 2020-01-06 NOTE — TELEPHONE ENCOUNTER
From: Mary Iverson  To: Devorah Braswell MD  Sent: 1/6/2020 9:32 AM CST  Subject: Test Results Question    can you also please share with me and Dr Silas Egan the results of the EKG that was done as pre-work for my physical and sent to [facility]?  My a

## 2020-01-06 NOTE — TELEPHONE ENCOUNTER
PC with PSA result from [facility] done 12/30/19. It was 1.87, wnls. He will report lab results to Urology apt today.

## 2020-01-06 NOTE — TELEPHONE ENCOUNTER
Patient called and says he has an appointment with a urologist today at noon. He asks if a nurse can please return his phone call with his [facility] PSA results.

## 2020-01-06 NOTE — TELEPHONE ENCOUNTER
From: Clara Knowles  To: Ger Plascencia MD  Sent: 1/6/2020 9:12 AM CST  Subject: Test Results Question    Do you have my physical exam blood work back yet?      I was hoping to have my PSA results for my appointment with Dr Cheryl Peacock today at 1145am.     My

## 2020-01-08 ENCOUNTER — OFFICE VISIT (OUTPATIENT)
Dept: CARDIOLOGY | Age: 65
End: 2020-01-08

## 2020-01-08 VITALS
HEART RATE: 60 BPM | BODY MASS INDEX: 28.28 KG/M2 | HEIGHT: 71 IN | SYSTOLIC BLOOD PRESSURE: 135 MMHG | DIASTOLIC BLOOD PRESSURE: 68 MMHG | WEIGHT: 202 LBS

## 2020-01-08 DIAGNOSIS — G47.33 OBSTRUCTIVE SLEEP APNEA SYNDROME: ICD-10-CM

## 2020-01-08 DIAGNOSIS — R06.09 DYSPNEA ON EXERTION: ICD-10-CM

## 2020-01-08 DIAGNOSIS — I48.0 PAROXYSMAL ATRIAL FIBRILLATION (CMD): ICD-10-CM

## 2020-01-08 DIAGNOSIS — I34.0 NONRHEUMATIC MITRAL VALVE REGURGITATION: Primary | ICD-10-CM

## 2020-01-08 PROCEDURE — 99215 OFFICE O/P EST HI 40 MIN: CPT | Performed by: INTERNAL MEDICINE

## 2020-01-09 ENCOUNTER — TELEPHONE (OUTPATIENT)
Dept: INTERNAL MEDICINE CLINIC | Facility: CLINIC | Age: 65
End: 2020-01-09

## 2020-01-09 NOTE — TELEPHONE ENCOUNTER
PC to pt    Patient saw cardiologist on 1/8/20 - Dr. Partha Chowdhury  For paroxysmal atrial fibrillation  Considering both medicine and ablation options. Will consult with Dr. Mary Goncalves  When back And cardiology.   Dr. Marcos Kelly reviewed consult today ( 1/9) and no furthe

## 2020-01-13 ENCOUNTER — TELEPHONE (OUTPATIENT)
Dept: INTERNAL MEDICINE CLINIC | Facility: CLINIC | Age: 65
End: 2020-01-13

## 2020-01-13 NOTE — TELEPHONE ENCOUNTER
ALLERGIES: Drake, RIANNA, Vanco    Pt states had cold symptoms on/off Nov into December then went away. Now has cough mainly dry am and evening with sore throat & PND. Ears are sore last couple days. He is leaving in am for [place] and would like a STACEY Huitron. Asked what OTC meds could take? Told him can use Zyrtec, Flonase and advised to take Afrin NS before flight tomorrow. He will be in [place] for @ 10 days. Dr. Peter Combs, ok for STACEY Huitron to take with him to [place]? PC re acute OV ok for 7:45 am 1/14 and start Afrin NS today to clear sinuses prior to flying tomorrow.

## 2020-01-13 NOTE — TELEPHONE ENCOUNTER
Patient called and says he has had a cough for the last month. He is now having ear pain but will be leaving to [place] tomorrow. He asks if a zpack can be called in and also has questions regarding what decongestants he can take since he is on medications for his high blood pressure. If something can be called in he asks that it please be sent to [facility] on [address] in [place].

## 2020-01-14 ENCOUNTER — OFFICE VISIT (OUTPATIENT)
Dept: INTERNAL MEDICINE CLINIC | Facility: CLINIC | Age: 65
End: 2020-01-14
Payer: COMMERCIAL

## 2020-01-14 VITALS
DIASTOLIC BLOOD PRESSURE: 75 MMHG | RESPIRATION RATE: 12 BRPM | HEART RATE: 62 BPM | TEMPERATURE: 99 F | BODY MASS INDEX: 28.31 KG/M2 | HEIGHT: 70.5 IN | SYSTOLIC BLOOD PRESSURE: 135 MMHG | OXYGEN SATURATION: 96 % | WEIGHT: 200 LBS

## 2020-01-14 DIAGNOSIS — J06.9 UPPER RESPIRATORY TRACT INFECTION, UNSPECIFIED TYPE: ICD-10-CM

## 2020-01-14 DIAGNOSIS — I48.0 PAF (PAROXYSMAL ATRIAL FIBRILLATION) (HCC): Primary | ICD-10-CM

## 2020-01-14 DIAGNOSIS — Z98.890 STATUS POST MITRAL VALVE REPAIR: ICD-10-CM

## 2020-01-14 PROCEDURE — 99213 OFFICE O/P EST LOW 20 MIN: CPT | Performed by: INTERNAL MEDICINE

## 2020-01-14 RX ORDER — FLUTICASONE PROPIONATE 50 MCG
SPRAY, SUSPENSION (ML) NASAL
Qty: 2 BOTTLE | Refills: 3 | Status: SHIPPED | OUTPATIENT
Start: 2020-01-14 | End: 2020-07-01 | Stop reason: ALTCHOICE

## 2020-01-15 NOTE — PROGRESS NOTES
Patient presents with:  Nasal Congestion  Ear Pain: lt  Cough      HPI: As above no fever no chills. Has a cough nasal congestion is relatively clear and on and off questionable ear pain. He is flying to [place] and will stay there for several weeks. cysts     Liver cyst     Essential hypertension     Diverticulosis     BPH with elevated PSA     Hemorrhoids, internal     Dyslipidemia     Lumbar herniated disc     Kidney stone on right side     Mitral valve disease     BPPV (benign paroxysmal positional Resp 12   Ht 70.5\"   Wt 200 lb (90.7 kg)   SpO2 96%   BMI 28.29 kg/m²   Constitutional: Oriented to person, place, and time. No distress. HEENT: Careful attention ears okay normocephalic and atraumatic.  Hearing and tympanic membranes normal.  Nose nor

## 2020-01-27 ENCOUNTER — APPOINTMENT (OUTPATIENT)
Dept: CARDIOLOGY | Age: 65
End: 2020-01-27

## 2020-01-31 NOTE — PROGRESS NOTES
HEALTH MAINTENANCE:      Immunization History   Administered Date(s) Administered   • FLUZONE 3 Yrs+ Quad Prsv Free 0.5 ml ([id number]) 09/22/2016   • Flucelvax 0.5 Ml Quad Pf Single Dose 10/14/2019   • Influenza 10/05/2011, 11/07/2012, 11/18/2013, 11/03/2014, Right Ear @ 40 dBHL - 4000 Hz:  Yes

## 2020-02-04 ENCOUNTER — TELEPHONE (OUTPATIENT)
Dept: CARDIOLOGY | Age: 65
End: 2020-02-04

## 2020-02-04 ENCOUNTER — OFFICE VISIT (OUTPATIENT)
Dept: INTERNAL MEDICINE CLINIC | Facility: CLINIC | Age: 65
End: 2020-02-04
Payer: COMMERCIAL

## 2020-02-04 ENCOUNTER — APPOINTMENT (OUTPATIENT)
Dept: CARDIOLOGY | Age: 65
End: 2020-02-04

## 2020-02-04 VITALS
OXYGEN SATURATION: 97 % | SYSTOLIC BLOOD PRESSURE: 140 MMHG | RESPIRATION RATE: 12 BRPM | WEIGHT: 202 LBS | TEMPERATURE: 98 F | HEART RATE: 62 BPM | HEIGHT: 70.5 IN | DIASTOLIC BLOOD PRESSURE: 82 MMHG | BODY MASS INDEX: 28.6 KG/M2

## 2020-02-04 DIAGNOSIS — Z00.01 ENCOUNTER FOR GENERAL ADULT MEDICAL EXAMINATION WITH ABNORMAL FINDINGS: Primary | ICD-10-CM

## 2020-02-04 PROCEDURE — 99396 PREV VISIT EST AGE 40-64: CPT | Performed by: INTERNAL MEDICINE

## 2020-02-04 RX ORDER — METOPROLOL SUCCINATE 50 MG/1
TABLET, EXTENDED RELEASE ORAL
COMMUNITY
Start: 2020-01-28 | End: 2020-04-14

## 2020-02-05 ENCOUNTER — TELEPHONE (OUTPATIENT)
Dept: INTERNAL MEDICINE CLINIC | Facility: CLINIC | Age: 65
End: 2020-02-05

## 2020-02-05 DIAGNOSIS — E78.5 DYSLIPIDEMIA: ICD-10-CM

## 2020-02-05 DIAGNOSIS — R73.03 PREDIABETES: Primary | ICD-10-CM

## 2020-02-10 ENCOUNTER — TELEPHONE (OUTPATIENT)
Dept: INTERNAL MEDICINE CLINIC | Facility: CLINIC | Age: 65
End: 2020-02-10

## 2020-02-10 NOTE — TELEPHONE ENCOUNTER
Pt Larry LARRY 55 was following up with  about a RX change Metoprolol 50mg.  was suppose to follow up with the cardiologist Dr. Maya Hinton to change med. Pt is calling to follow up.

## 2020-02-14 RX ORDER — DILTIAZEM HYDROCHLORIDE 180 MG/1
180 CAPSULE, EXTENDED RELEASE ORAL DAILY
COMMUNITY
End: 2020-05-04 | Stop reason: CLARIF

## 2020-02-14 RX ORDER — METOPROLOL SUCCINATE 25 MG/1
25 TABLET, EXTENDED RELEASE ORAL 2 TIMES DAILY
COMMUNITY
End: 2020-03-09 | Stop reason: SDUPTHER

## 2020-02-26 RX ORDER — LANCETS
EACH MISCELLANEOUS
Qty: 102 EACH | Refills: 3 | Status: SHIPPED | OUTPATIENT
Start: 2020-02-26

## 2020-02-26 RX ORDER — BLOOD SUGAR DIAGNOSTIC
STRIP MISCELLANEOUS
Qty: 100 STRIP | Refills: 3 | Status: SHIPPED | OUTPATIENT
Start: 2020-02-26

## 2020-02-26 NOTE — TELEPHONE ENCOUNTER
LOV 2/4/19    Last refill Accu Chek strips 9/17/19 externally reposrted                Accu-chek Fastclix Lancets 12/31/18 # 102 +3

## 2020-02-28 ENCOUNTER — OFFICE VISIT (OUTPATIENT)
Dept: INTERNAL MEDICINE CLINIC | Facility: CLINIC | Age: 65
End: 2020-02-28
Payer: COMMERCIAL

## 2020-02-28 VITALS
TEMPERATURE: 98 F | OXYGEN SATURATION: 96 % | DIASTOLIC BLOOD PRESSURE: 72 MMHG | SYSTOLIC BLOOD PRESSURE: 140 MMHG | HEART RATE: 68 BPM | RESPIRATION RATE: 14 BRPM

## 2020-02-28 DIAGNOSIS — H61.23 BILATERAL IMPACTED CERUMEN: Primary | ICD-10-CM

## 2020-02-28 RX ORDER — DILTIAZEM HYDROCHLORIDE 180 MG/1
180 CAPSULE, EXTENDED RELEASE ORAL
COMMUNITY
End: 2020-05-06

## 2020-02-28 NOTE — PROGRESS NOTES
This RN flushed patient's ears bilaterally with warm water and hydrogen peroxide. Minimal ear wax returned,. During procedure , patient did complain of slight dizziness for a short period of time while flushing Left ear. No c/o pain during procedure.

## 2020-02-29 PROBLEM — H61.23 BILATERAL IMPACTED CERUMEN: Status: ACTIVE | Noted: 2020-02-29

## 2020-03-02 ENCOUNTER — NURSE ONLY (OUTPATIENT)
Dept: INTERNAL MEDICINE CLINIC | Facility: CLINIC | Age: 65
End: 2020-03-02
Payer: COMMERCIAL

## 2020-03-02 ENCOUNTER — TELEPHONE (OUTPATIENT)
Dept: INTERNAL MEDICINE CLINIC | Facility: CLINIC | Age: 65
End: 2020-03-02

## 2020-03-02 PROCEDURE — 69210 REMOVE IMPACTED EAR WAX UNI: CPT | Performed by: INTERNAL MEDICINE

## 2020-03-09 ENCOUNTER — TELEPHONE (OUTPATIENT)
Dept: CARDIOLOGY | Age: 65
End: 2020-03-09

## 2020-03-09 RX ORDER — METOPROLOL SUCCINATE 50 MG/1
50 TABLET, EXTENDED RELEASE ORAL 2 TIMES DAILY
Qty: 180 TABLET | Refills: 2 | Status: SHIPPED | OUTPATIENT
Start: 2020-03-09 | End: 2020-07-22 | Stop reason: CLARIF

## 2020-04-14 ENCOUNTER — TELEPHONE (OUTPATIENT)
Dept: INTERNAL MEDICINE CLINIC | Facility: CLINIC | Age: 65
End: 2020-04-14

## 2020-04-14 DIAGNOSIS — I48.0 PAF (PAROXYSMAL ATRIAL FIBRILLATION) (HCC): Primary | ICD-10-CM

## 2020-04-14 RX ORDER — APIXABAN 5 MG/1
5 TABLET, FILM COATED ORAL 2 TIMES DAILY
Qty: 180 TABLET | Refills: 1 | Status: SHIPPED | OUTPATIENT
Start: 2020-04-14 | End: 2020-09-24

## 2020-04-14 RX ORDER — METOPROLOL SUCCINATE 50 MG/1
50 TABLET, EXTENDED RELEASE ORAL 2 TIMES DAILY
Qty: 180 TABLET | Refills: 3 | Status: SHIPPED | OUTPATIENT
Start: 2020-04-14 | End: 2020-05-06

## 2020-04-15 ENCOUNTER — APPOINTMENT (OUTPATIENT)
Dept: CARDIOLOGY | Age: 65
End: 2020-04-15

## 2020-04-28 ENCOUNTER — TELEPHONE (OUTPATIENT)
Dept: INTERNAL MEDICINE CLINIC | Facility: CLINIC | Age: 65
End: 2020-04-28

## 2020-04-28 NOTE — TELEPHONE ENCOUNTER
Patient states that earlier this year, he had an ear flush in our office to remove wax build up. He states that since then he has had noticeable hearing loss in both ears. It is not severe, but noticeable to him. He has no pain, no ear aches. He will not come in the office at this time. He has been guaranteed with family since 3/15/20. He is wondering if the doctor would consider coming to his house to look in his ears and determine if he might have wax build up again. Please call.

## 2020-05-04 ENCOUNTER — TELEPHONE (OUTPATIENT)
Dept: CARDIOLOGY | Age: 65
End: 2020-05-04

## 2020-05-06 ENCOUNTER — VIRTUAL PHONE E/M (OUTPATIENT)
Dept: INTERNAL MEDICINE CLINIC | Facility: CLINIC | Age: 65
End: 2020-05-06
Payer: COMMERCIAL

## 2020-05-06 ENCOUNTER — TELEPHONE (OUTPATIENT)
Dept: INTERNAL MEDICINE CLINIC | Facility: CLINIC | Age: 65
End: 2020-05-06

## 2020-05-06 DIAGNOSIS — I48.0 PAF (PAROXYSMAL ATRIAL FIBRILLATION) (HCC): Primary | ICD-10-CM

## 2020-05-06 DIAGNOSIS — Z98.890 STATUS POST MITRAL VALVE REPAIR: ICD-10-CM

## 2020-05-06 PROCEDURE — 99214 OFFICE O/P EST MOD 30 MIN: CPT | Performed by: INTERNAL MEDICINE

## 2020-05-06 RX ORDER — ROSUVASTATIN CALCIUM 5 MG/1
5 TABLET, COATED ORAL NIGHTLY
Qty: 90 TABLET | Refills: 3 | Status: SHIPPED | OUTPATIENT
Start: 2020-05-06 | End: 2020-11-16

## 2020-05-06 NOTE — TELEPHONE ENCOUNTER
[facility] ordered 30 day supply of Multaq and 7 day trial Multaq from PCP. Pt brought in pharmaceutical coupon but cost was @ $275. He would like to fill the 7 day rx first as he may not tolerate this rx. If he will continue on it requires PA.      They tri

## 2020-05-07 NOTE — PROGRESS NOTES
HPI:    Patient ID: Clara Knowles is a 59year old male telephone visit for PAF with hx MVR    Virtual/Telephone Check-In    Clara Knowles verbally consents to a Virtual/Telephone Check-In service on 05/06/20.   Patient understands and accepts f ACCU-CHEK SMARTVIEW In Vitro Strip USE AS DIRECTED 100 strip 3   • Fluticasone Propionate 50 MCG/ACT Nasal Suspension Take   Twice  A   Day  2  Sprays 2 Bottle 3   • Oxymetazoline HCl (AFRIN 12 HOUR) 0.05 % Nasal Solution 2  sprays  Twice   A  Day  For  3 interaction. 20min of time then 10more min 2nd call, then 5 more min text plus 10min call with cardio Giovani to discuss his case  Total 45min time spent.  Moderately complex decision making=new drug, adjust other drugs    Please note that the followi

## 2020-05-08 ENCOUNTER — TELEPHONE (OUTPATIENT)
Dept: CARDIOLOGY | Age: 65
End: 2020-05-08

## 2020-05-08 ENCOUNTER — TELEPHONE (OUTPATIENT)
Dept: INTERNAL MEDICINE CLINIC | Facility: CLINIC | Age: 65
End: 2020-05-08

## 2020-05-08 NOTE — TELEPHONE ENCOUNTER
Fax from [address] [place] [address] - [place] PA required for Multaq- rx is on plan's covered list. PA cancelled. Cost for # 60 tablets @ $700 at retail pharmacies. Dr. Chu Gonsales to contact pt re drug coverage.

## 2020-05-11 ENCOUNTER — APPOINTMENT (OUTPATIENT)
Dept: CARDIOLOGY | Age: 65
End: 2020-05-11

## 2020-05-12 RX ORDER — NEOMYCIN SULFATE, POLYMYXIN B SULFATE AND HYDROCORTISONE 10; 3.5; 1 MG/ML; MG/ML; [USP'U]/ML
SUSPENSION/ DROPS AURICULAR (OTIC)
Refills: 1 | COMMUNITY
Start: 2020-03-02 | End: 2020-05-13

## 2020-05-12 RX ORDER — ROSUVASTATIN CALCIUM 5 MG/1
5 TABLET, COATED ORAL DAILY
COMMUNITY
Start: 2020-05-06

## 2020-05-12 RX ORDER — OXYMETAZOLINE HYDROCHLORIDE 0.05 G/100ML
SPRAY NASAL
COMMUNITY
Start: 2020-01-14 | End: 2020-05-13

## 2020-05-13 ENCOUNTER — OFFICE VISIT (OUTPATIENT)
Dept: CARDIOLOGY | Age: 65
End: 2020-05-13

## 2020-05-13 ENCOUNTER — TELEPHONE (OUTPATIENT)
Dept: INTERNAL MEDICINE CLINIC | Facility: CLINIC | Age: 65
End: 2020-05-13

## 2020-05-13 ENCOUNTER — TELEPHONE (OUTPATIENT)
Dept: CARDIOLOGY | Age: 65
End: 2020-05-13

## 2020-05-13 VITALS
BODY MASS INDEX: 26.18 KG/M2 | HEART RATE: 67 BPM | HEIGHT: 71 IN | SYSTOLIC BLOOD PRESSURE: 119 MMHG | WEIGHT: 187 LBS | DIASTOLIC BLOOD PRESSURE: 67 MMHG

## 2020-05-13 DIAGNOSIS — I48.0 PAROXYSMAL ATRIAL FIBRILLATION (CMD): Primary | ICD-10-CM

## 2020-05-13 DIAGNOSIS — Z98.890 HISTORY OF MITRAL VALVE REPAIR: ICD-10-CM

## 2020-05-13 PROCEDURE — 99215 OFFICE O/P EST HI 40 MIN: CPT | Performed by: NURSE PRACTITIONER

## 2020-05-13 RX ORDER — BLOOD-GLUCOSE METER
1 EACH MISCELLANEOUS DAILY
Qty: 1 KIT | Refills: 0 | Status: SHIPPED | OUTPATIENT
Start: 2020-05-13 | End: 2021-05-13

## 2020-05-13 RX ORDER — LANCETS 33 GAUGE
1 EACH MISCELLANEOUS DAILY
Qty: 1 BOX | Refills: 3 | Status: SHIPPED | OUTPATIENT
Start: 2020-05-13 | End: 2021-05-13

## 2020-05-13 ASSESSMENT — ENCOUNTER SYMPTOMS
NEUROLOGICAL NEGATIVE: 1
SHORTNESS OF BREATH: 0
WEIGHT GAIN: 0
GASTROINTESTINAL NEGATIVE: 1
WEIGHT LOSS: 0
DIAPHORESIS: 0
SYNCOPE: 0
DECREASED APPETITE: 0

## 2020-05-13 NOTE — TELEPHONE ENCOUNTER
Per pharmacy, Accu-chek monitor no longer covered by insurance, need to switch pt to [employer] monitor and supplies.

## 2020-05-15 ENCOUNTER — E-ADVICE (OUTPATIENT)
Dept: CARDIOLOGY | Age: 65
End: 2020-05-15

## 2020-05-19 ENCOUNTER — PATIENT MESSAGE (OUTPATIENT)
Dept: INTERNAL MEDICINE CLINIC | Facility: CLINIC | Age: 65
End: 2020-05-19

## 2020-05-20 NOTE — TELEPHONE ENCOUNTER
From: Robert Wagner  To: Lazara Romo MD  Sent: 5/19/2020 7:04 PM CDT  Subject: Other    For my medical records and files, my blood type is O negative. This is consistent with a report that I had done and received years ago.

## 2020-06-09 ENCOUNTER — TELEPHONE (OUTPATIENT)
Dept: CARDIOLOGY | Age: 65
End: 2020-06-09

## 2020-06-09 ENCOUNTER — DOCUMENTATION (OUTPATIENT)
Dept: CARDIOLOGY | Age: 65
End: 2020-06-09

## 2020-06-12 ENCOUNTER — DOCUMENTATION (OUTPATIENT)
Dept: CARDIOLOGY | Age: 65
End: 2020-06-12

## 2020-07-06 ENCOUNTER — V-VISIT (OUTPATIENT)
Dept: CARDIOLOGY | Age: 65
End: 2020-07-06

## 2020-07-06 VITALS
SYSTOLIC BLOOD PRESSURE: 130 MMHG | DIASTOLIC BLOOD PRESSURE: 78 MMHG | BODY MASS INDEX: 26.18 KG/M2 | WEIGHT: 187 LBS | HEART RATE: 79 BPM | HEIGHT: 71 IN

## 2020-07-06 DIAGNOSIS — E55.9 VITAMIN D DEFICIENCY: ICD-10-CM

## 2020-07-06 DIAGNOSIS — R06.09 DYSPNEA ON EXERTION: Primary | ICD-10-CM

## 2020-07-06 DIAGNOSIS — G47.33 OBSTRUCTIVE SLEEP APNEA SYNDROME: ICD-10-CM

## 2020-07-06 DIAGNOSIS — R61 DIAPHORESIS: ICD-10-CM

## 2020-07-06 DIAGNOSIS — I34.1 MITRAL VALVE PROLAPSE: ICD-10-CM

## 2020-07-06 DIAGNOSIS — R53.83 MALAISE AND FATIGUE: ICD-10-CM

## 2020-07-06 DIAGNOSIS — R42 DIZZINESS: ICD-10-CM

## 2020-07-06 DIAGNOSIS — I27.21 PULMONARY HYPERTENSION SECONDARY TO INCREASED PVR (CMD): ICD-10-CM

## 2020-07-06 DIAGNOSIS — E78.00 PURE HYPERCHOLESTEROLEMIA: ICD-10-CM

## 2020-07-06 DIAGNOSIS — I48.0 PAROXYSMAL ATRIAL FIBRILLATION (CMD): ICD-10-CM

## 2020-07-06 DIAGNOSIS — I34.0 NONRHEUMATIC MITRAL VALVE REGURGITATION: ICD-10-CM

## 2020-07-06 DIAGNOSIS — R53.81 MALAISE AND FATIGUE: ICD-10-CM

## 2020-07-06 DIAGNOSIS — Z98.890 HISTORY OF MITRAL VALVE REPAIR: ICD-10-CM

## 2020-07-06 PROCEDURE — 99442 TELEPHONE E&M BY PHYSICIAN EST PT NOT ORIG PREV 7 DAYS 11-20 MIN: CPT | Performed by: INTERNAL MEDICINE

## 2020-07-06 ASSESSMENT — ENCOUNTER SYMPTOMS
FEVER: 0
HEMOPTYSIS: 0
COUGH: 0
WEIGHT GAIN: 0
WEIGHT LOSS: 0
CHILLS: 0
SUSPICIOUS LESIONS: 0
ALLERGIC/IMMUNOLOGIC COMMENTS: NO NEW FOOD ALLERGIES
BRUISES/BLEEDS EASILY: 0
HEMATOCHEZIA: 0

## 2020-07-06 ASSESSMENT — PATIENT HEALTH QUESTIONNAIRE - PHQ9
CLINICAL INTERPRETATION OF PHQ2 SCORE: NO FURTHER SCREENING NEEDED
CLINICAL INTERPRETATION OF PHQ9 SCORE: NO FURTHER SCREENING NEEDED
SUM OF ALL RESPONSES TO PHQ9 QUESTIONS 1 AND 2: 0
2. FEELING DOWN, DEPRESSED OR HOPELESS: NOT AT ALL
1. LITTLE INTEREST OR PLEASURE IN DOING THINGS: NOT AT ALL
SUM OF ALL RESPONSES TO PHQ9 QUESTIONS 1 AND 2: 0

## 2020-07-22 ENCOUNTER — OFFICE VISIT (OUTPATIENT)
Dept: CARDIOLOGY | Age: 65
End: 2020-07-22

## 2020-07-22 VITALS
BODY MASS INDEX: 26.88 KG/M2 | HEIGHT: 71 IN | WEIGHT: 192 LBS | HEART RATE: 80 BPM | SYSTOLIC BLOOD PRESSURE: 136 MMHG | DIASTOLIC BLOOD PRESSURE: 78 MMHG

## 2020-07-22 VITALS
WEIGHT: 192 LBS | SYSTOLIC BLOOD PRESSURE: 136 MMHG | HEART RATE: 80 BPM | DIASTOLIC BLOOD PRESSURE: 78 MMHG | BODY MASS INDEX: 26.88 KG/M2 | RESPIRATION RATE: 18 BRPM | HEIGHT: 71 IN

## 2020-07-22 DIAGNOSIS — R00.2 PALPITATIONS: ICD-10-CM

## 2020-07-22 DIAGNOSIS — I48.91 ATRIAL FIBRILLATION, UNSPECIFIED TYPE (CMD): ICD-10-CM

## 2020-07-22 DIAGNOSIS — I50.32 CHRONIC HEART FAILURE WITH PRESERVED EJECTION FRACTION (HFPEF) (CMD): Primary | ICD-10-CM

## 2020-07-22 DIAGNOSIS — I27.20 PULMONARY HYPERTENSION (CMD): ICD-10-CM

## 2020-07-22 DIAGNOSIS — R06.09 DYSPNEA ON EXERTION: ICD-10-CM

## 2020-07-22 DIAGNOSIS — I48.0 PAROXYSMAL ATRIAL FIBRILLATION (CMD): Primary | ICD-10-CM

## 2020-07-22 PROCEDURE — 99214 OFFICE O/P EST MOD 30 MIN: CPT | Performed by: INTERNAL MEDICINE

## 2020-07-22 PROCEDURE — 99213 OFFICE O/P EST LOW 20 MIN: CPT | Performed by: INTERNAL MEDICINE

## 2020-07-22 RX ORDER — METOPROLOL SUCCINATE 50 MG/1
50 TABLET, EXTENDED RELEASE ORAL DAILY
COMMUNITY
End: 2021-01-11 | Stop reason: SDUPTHER

## 2020-07-22 ASSESSMENT — PATIENT HEALTH QUESTIONNAIRE - PHQ9
2. FEELING DOWN, DEPRESSED OR HOPELESS: NOT AT ALL
1. LITTLE INTEREST OR PLEASURE IN DOING THINGS: NOT AT ALL
CLINICAL INTERPRETATION OF PHQ2 SCORE: NO FURTHER SCREENING NEEDED
2. FEELING DOWN, DEPRESSED OR HOPELESS: NOT AT ALL
CLINICAL INTERPRETATION OF PHQ9 SCORE: NO FURTHER SCREENING NEEDED
SUM OF ALL RESPONSES TO PHQ9 QUESTIONS 1 AND 2: 0
SUM OF ALL RESPONSES TO PHQ9 QUESTIONS 1 AND 2: 0
CLINICAL INTERPRETATION OF PHQ9 SCORE: NO FURTHER SCREENING NEEDED
SUM OF ALL RESPONSES TO PHQ9 QUESTIONS 1 AND 2: 0
1. LITTLE INTEREST OR PLEASURE IN DOING THINGS: NOT AT ALL
CLINICAL INTERPRETATION OF PHQ2 SCORE: NO FURTHER SCREENING NEEDED
SUM OF ALL RESPONSES TO PHQ9 QUESTIONS 1 AND 2: 0

## 2020-07-22 ASSESSMENT — ENCOUNTER SYMPTOMS
ALLERGIC/IMMUNOLOGIC COMMENTS: NO NEW FOOD ALLERGIES
HEMOPTYSIS: 0
HEMATOCHEZIA: 0
DIZZINESS: 1
FEVER: 0
COUGH: 0
WEIGHT LOSS: 1
BRUISES/BLEEDS EASILY: 0
SUSPICIOUS LESIONS: 0
WEIGHT GAIN: 0
CHILLS: 0

## 2020-07-24 ENCOUNTER — TELEPHONE (OUTPATIENT)
Dept: INTERNAL MEDICINE CLINIC | Facility: CLINIC | Age: 65
End: 2020-07-24

## 2020-08-28 RX ORDER — LISINOPRIL 40 MG/1
40 TABLET ORAL DAILY
Qty: 90 TABLET | Refills: 3 | Status: SHIPPED | OUTPATIENT
Start: 2020-08-28 | End: 2021-01-11 | Stop reason: SDUPTHER

## 2020-09-24 ENCOUNTER — PATIENT MESSAGE (OUTPATIENT)
Dept: INTERNAL MEDICINE CLINIC | Facility: CLINIC | Age: 65
End: 2020-09-24

## 2020-09-24 RX ORDER — APIXABAN 5 MG/1
TABLET, FILM COATED ORAL
Qty: 180 TABLET | Refills: 1 | Status: SHIPPED | OUTPATIENT
Start: 2020-09-24

## 2020-09-25 NOTE — TELEPHONE ENCOUNTER
From: Ramiro Wilson  To: Tatiana Banuelos MD  Sent: 9/24/2020 7:52 PM CDT  Subject: Other    I got my flu shot on 9/14/2020.

## 2020-11-16 RX ORDER — ROSUVASTATIN CALCIUM 5 MG/1
TABLET, COATED ORAL
Qty: 90 TABLET | Refills: 1 | Status: SHIPPED | OUTPATIENT
Start: 2020-11-16 | End: 2021-07-08

## 2020-11-17 ENCOUNTER — E-ADVICE (OUTPATIENT)
Dept: CARDIOLOGY | Age: 65
End: 2020-11-17

## 2021-01-01 ENCOUNTER — EXTERNAL RECORD (OUTPATIENT)
Dept: OTHER | Age: 66
End: 2021-01-01

## 2021-01-01 ENCOUNTER — MED REC SCAN ONLY (OUTPATIENT)
Dept: INTERNAL MEDICINE CLINIC | Facility: CLINIC | Age: 66
End: 2021-01-01

## 2021-01-11 ENCOUNTER — V-VISIT (OUTPATIENT)
Dept: CARDIOLOGY | Age: 66
End: 2021-01-11

## 2021-01-11 VITALS
DIASTOLIC BLOOD PRESSURE: 68 MMHG | WEIGHT: 197 LBS | HEART RATE: 74 BPM | HEIGHT: 71 IN | BODY MASS INDEX: 27.58 KG/M2 | SYSTOLIC BLOOD PRESSURE: 122 MMHG

## 2021-01-11 DIAGNOSIS — I34.1 MITRAL VALVE PROLAPSE: ICD-10-CM

## 2021-01-11 DIAGNOSIS — I48.0 PAROXYSMAL ATRIAL FIBRILLATION (CMD): ICD-10-CM

## 2021-01-11 DIAGNOSIS — Z98.890 HISTORY OF MITRAL VALVE REPAIR: ICD-10-CM

## 2021-01-11 DIAGNOSIS — E55.9 VITAMIN D DEFICIENCY: ICD-10-CM

## 2021-01-11 DIAGNOSIS — R53.83 MALAISE AND FATIGUE: Primary | ICD-10-CM

## 2021-01-11 DIAGNOSIS — R61 DIAPHORESIS: ICD-10-CM

## 2021-01-11 DIAGNOSIS — G47.33 OBSTRUCTIVE SLEEP APNEA SYNDROME: ICD-10-CM

## 2021-01-11 DIAGNOSIS — I27.21 PULMONARY HYPERTENSION SECONDARY TO INCREASED PVR (CMD): ICD-10-CM

## 2021-01-11 DIAGNOSIS — E78.00 PURE HYPERCHOLESTEROLEMIA: ICD-10-CM

## 2021-01-11 DIAGNOSIS — I48.91 ATRIAL FIBRILLATION, UNSPECIFIED TYPE (CMD): ICD-10-CM

## 2021-01-11 DIAGNOSIS — R00.2 PALPITATIONS: ICD-10-CM

## 2021-01-11 DIAGNOSIS — R53.81 MALAISE AND FATIGUE: Primary | ICD-10-CM

## 2021-01-11 DIAGNOSIS — R42 DIZZINESS: ICD-10-CM

## 2021-01-11 DIAGNOSIS — I34.0 NONRHEUMATIC MITRAL VALVE REGURGITATION: ICD-10-CM

## 2021-01-11 DIAGNOSIS — I65.23 BILATERAL CAROTID ARTERY STENOSIS: ICD-10-CM

## 2021-01-11 DIAGNOSIS — R06.09 DYSPNEA ON EXERTION: ICD-10-CM

## 2021-01-11 PROCEDURE — 99214 OFFICE O/P EST MOD 30 MIN: CPT | Performed by: INTERNAL MEDICINE

## 2021-01-11 RX ORDER — LISINOPRIL 40 MG/1
40 TABLET ORAL DAILY
Qty: 90 TABLET | Refills: 3 | Status: SHIPPED | OUTPATIENT
Start: 2021-01-11

## 2021-01-11 RX ORDER — METOPROLOL SUCCINATE 50 MG/1
50 TABLET, EXTENDED RELEASE ORAL DAILY
Qty: 90 TABLET | Refills: 3 | Status: SHIPPED | OUTPATIENT
Start: 2021-01-11

## 2021-01-11 SDOH — HEALTH STABILITY: PHYSICAL HEALTH: ON AVERAGE, HOW MANY DAYS PER WEEK DO YOU ENGAGE IN MODERATE TO STRENUOUS EXERCISE (LIKE A BRISK WALK)?: 0 DAYS

## 2021-01-11 SDOH — HEALTH STABILITY: PHYSICAL HEALTH: ON AVERAGE, HOW MANY MINUTES DO YOU ENGAGE IN EXERCISE AT THIS LEVEL?: 0 MIN

## 2021-01-11 ASSESSMENT — ENCOUNTER SYMPTOMS
WEIGHT LOSS: 0
ALLERGIC/IMMUNOLOGIC COMMENTS: NO NEW FOOD ALLERGIES
FEVER: 0
HEMOPTYSIS: 0
CHILLS: 0
WEIGHT GAIN: 0
SUSPICIOUS LESIONS: 0
COUGH: 0
HEMATOCHEZIA: 0
BRUISES/BLEEDS EASILY: 0

## 2021-01-11 ASSESSMENT — PATIENT HEALTH QUESTIONNAIRE - PHQ9
2. FEELING DOWN, DEPRESSED OR HOPELESS: NOT AT ALL
CLINICAL INTERPRETATION OF PHQ2 SCORE: NO FURTHER SCREENING NEEDED
SUM OF ALL RESPONSES TO PHQ9 QUESTIONS 1 AND 2: 0
1. LITTLE INTEREST OR PLEASURE IN DOING THINGS: NOT AT ALL
CLINICAL INTERPRETATION OF PHQ9 SCORE: NO FURTHER SCREENING NEEDED
SUM OF ALL RESPONSES TO PHQ9 QUESTIONS 1 AND 2: 0

## 2021-01-13 ENCOUNTER — LAB ENCOUNTER (OUTPATIENT)
Dept: LAB | Age: 66
End: 2021-01-13
Attending: INTERNAL MEDICINE
Payer: MEDICARE

## 2021-01-13 ENCOUNTER — NURSE ONLY (OUTPATIENT)
Dept: INTERNAL MEDICINE CLINIC | Facility: CLINIC | Age: 66
End: 2021-01-13
Payer: MEDICARE

## 2021-01-13 DIAGNOSIS — R69 DIAGNOSIS UNKNOWN: Primary | ICD-10-CM

## 2021-01-13 DIAGNOSIS — R73.03 PREDIABETES: ICD-10-CM

## 2021-01-13 DIAGNOSIS — Z00.00 LABORATORY EXAMINATION ORDERED AS PART OF A ROUTINE GENERAL MEDICAL EXAMINATION: Primary | ICD-10-CM

## 2021-01-13 LAB
AMB EXT CHOLESTEROL, TOTAL: 141 MG/DL
AMB EXT COVID-19 IGG: NEGATIVE
AMB EXT CREATININE: 1 MG/DL
AMB EXT GLUCOSE: 121 MG/DL
AMB EXT HDL CHOLESTEROL: 56 MG/DL
AMB EXT HEMATOCRIT: 45.7
AMB EXT HEMOGLOBIN: 14.8
AMB EXT HGBA1C: 6.1 %
AMB EXT LDL CHOLESTEROL, DIRECT: 69 MG/DL
AMB EXT MCV: 82.8
AMB EXT PLATELETS: 226
AMB EXT PSA SCREEN: 1.97 NG/ML
AMB EXT TRIGLYCERIDES: 80 MG/DL
AMB EXT TSH: 2.09 MIU/ML
AMB EXT WBC: 7.3 X10(3)UL
BILIRUB UR QL STRIP.AUTO: NEGATIVE
CLARITY UR REFRACT.AUTO: CLEAR
COLOR UR AUTO: YELLOW
CREAT UR-SCNC: 85.2 MG/DL
GLUCOSE UR STRIP.AUTO-MCNC: NEGATIVE MG/DL
KETONES UR STRIP.AUTO-MCNC: NEGATIVE MG/DL
LEUKOCYTE ESTERASE UR QL STRIP.AUTO: NEGATIVE
MICROALBUMIN UR-MCNC: 0.86 MG/DL
MICROALBUMIN/CREAT 24H UR-RTO: 10.1 UG/MG (ref ?–30)
NITRITE UR QL STRIP.AUTO: NEGATIVE
PH UR STRIP.AUTO: 5 [PH] (ref 4.5–8)
PROT UR STRIP.AUTO-MCNC: NEGATIVE MG/DL
RBC UR QL AUTO: NEGATIVE
SP GR UR STRIP.AUTO: 1.01 (ref 1–1.03)
UROBILINOGEN UR STRIP.AUTO-MCNC: <2 MG/DL

## 2021-01-13 PROCEDURE — 92551 PURE TONE HEARING TEST AIR: CPT | Performed by: INTERNAL MEDICINE

## 2021-01-13 PROCEDURE — 93000 ELECTROCARDIOGRAM COMPLETE: CPT | Performed by: INTERNAL MEDICINE

## 2021-01-13 PROCEDURE — 82570 ASSAY OF URINE CREATININE: CPT | Performed by: INTERNAL MEDICINE

## 2021-01-13 PROCEDURE — 81003 URINALYSIS AUTO W/O SCOPE: CPT | Performed by: INTERNAL MEDICINE

## 2021-01-13 PROCEDURE — 82043 UR ALBUMIN QUANTITATIVE: CPT | Performed by: INTERNAL MEDICINE

## 2021-01-13 RX ORDER — METOPROLOL SUCCINATE 50 MG/1
50 TABLET, EXTENDED RELEASE ORAL DAILY
COMMUNITY
Start: 2020-06-02 | End: 2021-01-25

## 2021-01-13 RX ORDER — ACETAMINOPHEN 500 MG
1000 TABLET ORAL
COMMUNITY
Start: 2020-06-02 | End: 2021-01-25 | Stop reason: ALTCHOICE

## 2021-01-13 RX ORDER — METOPROLOL SUCCINATE 50 MG/1
50 TABLET, EXTENDED RELEASE ORAL DAILY
COMMUNITY
Start: 2004-02-14

## 2021-01-13 RX ORDER — METOPROLOL SUCCINATE 50 MG/1
TABLET, EXTENDED RELEASE ORAL
COMMUNITY
Start: 2021-01-12 | End: 2021-01-25

## 2021-01-13 RX ORDER — OMEPRAZOLE 40 MG/1
40 CAPSULE, DELAYED RELEASE ORAL
COMMUNITY
Start: 2020-06-02 | End: 2021-04-15

## 2021-01-13 NOTE — PROGRESS NOTES
*LOOKIN' BODY RESULTS    YES:       x:       REASON TEST NOT PERFORMED:Lookin body not working properly        HEIGHT:5'10.5   HRJNQL:166. 1LBS  _____________________________________________________________________________  *VENIPUNCTURE    x:       NO:

## 2021-01-14 LAB — SARS COV-2 IGG (OFFPRE82): NEGATIVE

## 2021-01-18 ENCOUNTER — MED REC SCAN ONLY (OUTPATIENT)
Dept: INTERNAL MEDICINE CLINIC | Facility: CLINIC | Age: 66
End: 2021-01-18

## 2021-01-22 ENCOUNTER — E-ADVICE (OUTPATIENT)
Dept: CARDIOLOGY | Age: 66
End: 2021-01-22

## 2021-01-25 ENCOUNTER — E-ADVICE (OUTPATIENT)
Dept: CARDIOLOGY | Age: 66
End: 2021-01-25

## 2021-01-25 ENCOUNTER — OFFICE VISIT (OUTPATIENT)
Dept: INTERNAL MEDICINE CLINIC | Facility: CLINIC | Age: 66
End: 2021-01-25
Payer: MEDICARE

## 2021-01-25 VITALS
HEART RATE: 82 BPM | OXYGEN SATURATION: 98 % | SYSTOLIC BLOOD PRESSURE: 150 MMHG | WEIGHT: 199 LBS | HEIGHT: 70.5 IN | RESPIRATION RATE: 16 BRPM | TEMPERATURE: 98 F | BODY MASS INDEX: 28.17 KG/M2 | DIASTOLIC BLOOD PRESSURE: 84 MMHG

## 2021-01-25 DIAGNOSIS — N28.1 KIDNEY CYSTS: ICD-10-CM

## 2021-01-25 DIAGNOSIS — Z00.01 ENCOUNTER FOR GENERAL ADULT MEDICAL EXAMINATION WITH ABNORMAL FINDINGS: Primary | ICD-10-CM

## 2021-01-25 DIAGNOSIS — Z12.11 ENCOUNTER FOR SCREENING COLONOSCOPY: ICD-10-CM

## 2021-01-25 DIAGNOSIS — K40.90 INGUINAL HERNIA WITHOUT OBSTRUCTION OR GANGRENE, RECURRENCE NOT SPECIFIED, UNSPECIFIED LATERALITY: ICD-10-CM

## 2021-01-25 PROCEDURE — G0402 INITIAL PREVENTIVE EXAM: HCPCS | Performed by: INTERNAL MEDICINE

## 2021-01-26 ENCOUNTER — TELEPHONE (OUTPATIENT)
Dept: INTERNAL MEDICINE CLINIC | Facility: CLINIC | Age: 66
End: 2021-01-26

## 2021-01-26 NOTE — TELEPHONE ENCOUNTER
Pt called and asks that a nurse please confirm that the correct US order is in his chart.  He says he called central scheduling and was told that the US order is for kidneys but that it is not matching what shows on his AVS. He asks for a call back once the

## 2021-01-28 PROBLEM — H61.23 BILATERAL IMPACTED CERUMEN: Status: RESOLVED | Noted: 2020-02-29 | Resolved: 2021-01-28

## 2021-01-29 ENCOUNTER — TELEPHONE (OUTPATIENT)
Dept: INTERNAL MEDICINE CLINIC | Facility: CLINIC | Age: 66
End: 2021-01-29

## 2021-01-29 DIAGNOSIS — R73.01 IFG (IMPAIRED FASTING GLUCOSE): Primary | ICD-10-CM

## 2021-01-29 NOTE — PROGRESS NOTES
HPI:    Patient ID: Krystal Servin is a 72year old male. Here for CPE-mailly cardiac issues-stable    Paroxymal afib, Mitral valve replacement      Review of Systems   Constitutional: Positive for fatigue (severe-from metoprolol).    HENT:        Garett Accu-Chek FastClix Lancets Does not apply Misc USE ONCE DAILY USE AS DIRECTED 102 each 3   • ACCU-CHEK SMARTVIEW In Vitro Strip USE AS DIRECTED 100 strip 3   • lisinopril 40 MG Oral Tab TK 1 T PO D     • Multiple Vitamins-Minerals (COMPLETE DAILY/LUTEIN) O Kidney stone on right side     Mitral valve disease     BPPV (benign paroxysmal positional vertigo)     Bilateral inguinal hernia     Prediabetes     Family history of colon cancer     History of pneumonia     History of colon polyps     PAF (paroxysmal at Cystoscopy    • LASER SURGERY OF EYE  2009    left   • TONSILLECTOMY  1246   • UMBILICAL HERNIA REPAIR  3/11       Family History:  Family History   Problem Relation Age of Onset   • Cancer Father         colon   • Other (Other) Father    • [employer] service: Not on file        Active member of club or organization: Not on file        Attends meetings of clubs or organizations: Not on file        Relationship status: Not on file      Intimate partner violence        Fear of current or ex part Health Inventory: Impaired  Metabolic syndrome (hypertension, lipid abnormality, obesity, pre-diabetic): Not present  Mini-Mental Examination: Normal in conversation  Nutritional Screen: Reviewed and discussed   SAFE Questions (Domestic Violence): Negative abnormal findings  (primary encounter diagnosis)  Encounter for screening colonoscopy  Kidney cysts  Inguinal hernia without obstruction or gangrene, recurrence not specified, unspecified laterality        Meds This Visit:  Requested Prescriptions      No

## 2021-01-29 NOTE — TELEPHONE ENCOUNTER
Per Dr. Beck Lav: 3 month follow up labs from last OV:    [place]: A1c    [place]: Oral [place] orders placed. Copies of lab order to lab, mailed to patient and kept by RN.      Letter was sent to patient explaining follow up testing procedure

## 2021-02-04 ENCOUNTER — HOSPITAL ENCOUNTER (OUTPATIENT)
Dept: ULTRASOUND IMAGING | Age: 66
Discharge: HOME | End: 2021-02-04
Attending: INTERNAL MEDICINE
Payer: MEDICARE

## 2021-02-04 ENCOUNTER — HOSPITAL ENCOUNTER (OUTPATIENT)
Dept: GENERAL RADIOLOGY | Age: 66
Discharge: HOME | End: 2021-02-04
Attending: UROLOGY
Payer: MEDICARE

## 2021-02-04 DIAGNOSIS — N28.1 KIDNEY CYSTS: Primary | ICD-10-CM

## 2021-02-04 DIAGNOSIS — N28.1 KIDNEY CYSTS: ICD-10-CM

## 2021-02-04 DIAGNOSIS — N20.0 KIDNEY STONE: ICD-10-CM

## 2021-02-04 PROCEDURE — 76775 US EXAM ABDO BACK WALL LIM: CPT | Performed by: INTERNAL MEDICINE

## 2021-02-04 PROCEDURE — 74018 RADEX ABDOMEN 1 VIEW: CPT | Performed by: UROLOGY

## 2021-02-06 ENCOUNTER — PATIENT MESSAGE (OUTPATIENT)
Dept: INTERNAL MEDICINE CLINIC | Facility: CLINIC | Age: 66
End: 2021-02-06

## 2021-02-06 ENCOUNTER — E-ADVICE (OUTPATIENT)
Dept: CARDIOLOGY | Age: 66
End: 2021-02-06

## 2021-02-08 NOTE — TELEPHONE ENCOUNTER
From: Abbie Castro  To: Varinder Duncan MD  Sent: 2/6/2021 2:19 PM CST  Subject: Other    Please share the various blood tests results from my annual exam with Dr Gabbi Rocha' office.  Because they are done by [facility], I am not sure that

## 2021-02-10 ENCOUNTER — DOCUMENTATION (OUTPATIENT)
Dept: CARDIOLOGY | Age: 66
End: 2021-02-10

## 2021-03-03 DIAGNOSIS — Z23 NEED FOR VACCINATION: ICD-10-CM

## 2021-03-23 ENCOUNTER — TELEPHONE (OUTPATIENT)
Dept: CARDIOLOGY | Age: 66
End: 2021-03-23

## 2021-03-30 ENCOUNTER — LAB ENCOUNTER (OUTPATIENT)
Dept: LAB | Age: 66
End: 2021-03-30
Attending: STUDENT IN AN ORGANIZED HEALTH CARE EDUCATION/TRAINING PROGRAM
Payer: MEDICARE

## 2021-03-30 DIAGNOSIS — Z01.818 PRE-OP TESTING: ICD-10-CM

## 2021-04-02 PROBLEM — K21.00 GASTRO-ESOPHAGEAL REFLUX DISEASE WITH ESOPHAGITIS: Status: ACTIVE | Noted: 2021-04-02

## 2021-04-02 PROBLEM — K29.70 GASTRITIS: Status: ACTIVE | Noted: 2021-04-02

## 2021-04-02 PROBLEM — K64.2 THIRD DEGREE HEMORRHOIDS: Status: ACTIVE | Noted: 2021-04-02

## 2021-04-02 PROBLEM — R10.32 ABDOMINAL PAIN, LEFT LOWER QUADRANT: Status: ACTIVE | Noted: 2021-04-02

## 2021-04-02 PROBLEM — R09.A2 GLOBUS SENSATION: Status: ACTIVE | Noted: 2021-04-02

## 2021-04-02 PROBLEM — K63.5 COLON POLYPS: Status: ACTIVE | Noted: 2021-04-02

## 2021-04-02 PROBLEM — R19.8 GLOBUS SENSATION: Status: ACTIVE | Noted: 2021-04-02

## 2021-04-02 PROBLEM — R09.89 GLOBUS SENSATION: Status: ACTIVE | Noted: 2021-04-02

## 2021-05-12 ENCOUNTER — TELEPHONE (OUTPATIENT)
Dept: CARDIOLOGY | Age: 66
End: 2021-05-12

## 2021-05-12 ENCOUNTER — E-ADVICE (OUTPATIENT)
Dept: CARDIOLOGY | Age: 66
End: 2021-05-12

## 2021-05-21 ENCOUNTER — TELEPHONE (OUTPATIENT)
Dept: CARDIOLOGY | Age: 66
End: 2021-05-21

## 2021-06-22 ENCOUNTER — E-ADVICE (OUTPATIENT)
Dept: CARDIOLOGY | Age: 66
End: 2021-06-22

## 2021-06-29 ENCOUNTER — LAB ENCOUNTER (OUTPATIENT)
Dept: LAB | Age: 66
End: 2021-06-29
Attending: STUDENT IN AN ORGANIZED HEALTH CARE EDUCATION/TRAINING PROGRAM
Payer: MEDICARE

## 2021-06-29 DIAGNOSIS — Z01.818 PRE-OP TESTING: ICD-10-CM

## 2021-06-29 LAB — SARS-COV-2 RNA RESP QL NAA+PROBE: NOT DETECTED

## 2021-07-08 RX ORDER — ROSUVASTATIN CALCIUM 5 MG/1
TABLET, COATED ORAL
Qty: 90 TABLET | Refills: 3 | Status: SHIPPED | OUTPATIENT
Start: 2021-07-08

## 2021-07-08 NOTE — TELEPHONE ENCOUNTER
1- Refill request on rosuvastatin 5mg  2- Quantity of: 90 tabs  3- Instructions: to take one tab po nightly   4- Last refill was on: 11/16/2020 for 90-tabs, 1-refill(s)  5- Last CPE was: 1/25/2021. Rx sent.

## 2021-07-15 ENCOUNTER — LAB ENCOUNTER (OUTPATIENT)
Dept: LAB | Age: 66
End: 2021-07-15
Attending: INTERNAL MEDICINE
Payer: MEDICARE

## 2021-07-15 ENCOUNTER — HOSPITAL ENCOUNTER (OUTPATIENT)
Dept: ULTRASOUND IMAGING | Age: 66
Discharge: HOME | End: 2021-07-15
Attending: INTERNAL MEDICINE
Payer: MEDICARE

## 2021-07-15 ENCOUNTER — NURSE ONLY (OUTPATIENT)
Dept: INTERNAL MEDICINE CLINIC | Facility: CLINIC | Age: 66
End: 2021-07-15
Payer: MEDICARE

## 2021-07-15 DIAGNOSIS — I65.23 BILATERAL CAROTID ARTERY STENOSIS: ICD-10-CM

## 2021-07-15 DIAGNOSIS — R69 DIAGNOSIS UNKNOWN: Primary | ICD-10-CM

## 2021-07-15 DIAGNOSIS — K22.70 BARRETT'S ESOPHAGUS DETERMINED BY BIOPSY: ICD-10-CM

## 2021-07-15 LAB
ANION GAP SERPL CALC-SCNC: 2 MMOL/L (ref 0–18)
BILIRUB UR QL STRIP.AUTO: NEGATIVE
BUN BLD-MCNC: 20 MG/DL (ref 7–18)
BUN/CREAT SERPL: 22.7 (ref 10–20)
CALCIUM BLD-MCNC: 9.8 MG/DL (ref 8.5–10.1)
CHLORIDE SERPL-SCNC: 107 MMOL/L (ref 98–112)
CLARITY UR REFRACT.AUTO: CLEAR
CO2 SERPL-SCNC: 31 MMOL/L (ref 21–32)
COLOR UR AUTO: YELLOW
CREAT BLD-MCNC: 0.88 MG/DL
GLUCOSE BLD-MCNC: 122 MG/DL (ref 70–99)
GLUCOSE UR STRIP.AUTO-MCNC: NEGATIVE MG/DL
KETONES UR STRIP.AUTO-MCNC: NEGATIVE MG/DL
LEUKOCYTE ESTERASE UR QL STRIP.AUTO: NEGATIVE
NITRITE UR QL STRIP.AUTO: NEGATIVE
OSMOLALITY SERPL CALC.SUM OF ELEC: 294 MOSM/KG (ref 275–295)
PATIENT FASTING Y/N/NP: YES
PH UR STRIP.AUTO: 6 [PH] (ref 5–8)
POTASSIUM SERPL-SCNC: 4.4 MMOL/L (ref 3.5–5.1)
PROT UR STRIP.AUTO-MCNC: NEGATIVE MG/DL
SODIUM SERPL-SCNC: 140 MMOL/L (ref 136–145)
SP GR UR STRIP.AUTO: 1.01 (ref 1–1.03)
UROBILINOGEN UR STRIP.AUTO-MCNC: <2 MG/DL

## 2021-07-15 PROCEDURE — 81001 URINALYSIS AUTO W/SCOPE: CPT | Performed by: INTERNAL MEDICINE

## 2021-07-15 PROCEDURE — 36415 COLL VENOUS BLD VENIPUNCTURE: CPT

## 2021-07-15 PROCEDURE — 93880 EXTRACRANIAL BILAT STUDY: CPT | Performed by: INTERNAL MEDICINE

## 2021-07-15 PROCEDURE — 80048 BASIC METABOLIC PNL TOTAL CA: CPT

## 2021-07-20 ENCOUNTER — TELEPHONE (OUTPATIENT)
Dept: INTERNAL MEDICINE CLINIC | Facility: CLINIC | Age: 66
End: 2021-07-20

## 2021-07-20 NOTE — TELEPHONE ENCOUNTER
Per Dr. Montana Frost: re: CHL labs (omega) much better, add extra fish salmon/sardines 1x per week. Notified patient via [employer]. Hold to confirm patient has read the message.

## 2021-07-20 NOTE — TELEPHONE ENCOUNTER
Only a problem with high Omega levels>10 or more than 6 Omega capsule a day. His level of 4 is still below the recommendations. I will call.

## 2021-07-20 NOTE — TELEPHONE ENCOUNTER
Trever Gerber 18 minutes ago (3:12 PM)     Good. I did see an article that said to be cautious because \"if you take omega-3 fatty acids or vitamin E supplements and a blood thinner or aspirin, you could have an increased risk for bleeding\".    I

## 2021-08-02 ENCOUNTER — APPOINTMENT (OUTPATIENT)
Dept: CARDIOLOGY | Age: 66
End: 2021-08-02

## 2021-08-16 ENCOUNTER — TELEPHONE (OUTPATIENT)
Dept: INTERNAL MEDICINE CLINIC | Facility: CLINIC | Age: 66
End: 2021-08-16

## 2021-08-16 DIAGNOSIS — J02.9 SORE THROAT: Primary | ICD-10-CM

## 2021-08-16 NOTE — TELEPHONE ENCOUNTER
Spoke with patient. States has scratchy dry throat, some fatigue, afebrile, could be seasonal allergies, but, concerned due to infant grandson. Did go to [facility], but, had mask on. Requesting covid swab. Did explain Dr. Lisbet Ramirez out of office.  Will check with

## 2021-08-16 NOTE — TELEPHONE ENCOUNTER
Patient has not been exposed to covid that he knows of, has not traveled out of the state, but he is having a runny nose and tickle in his throat. He would like to be tested because he has a  grandson.

## 2021-08-17 ENCOUNTER — LAB ENCOUNTER (OUTPATIENT)
Dept: LAB | Age: 66
End: 2021-08-17
Attending: INTERNAL MEDICINE
Payer: MEDICARE

## 2021-08-17 DIAGNOSIS — J02.9 SORE THROAT: ICD-10-CM

## 2021-08-19 LAB — SARS-COV-2 RNA RESP QL NAA+PROBE: NOT DETECTED

## 2021-09-22 ENCOUNTER — LAB ENCOUNTER (OUTPATIENT)
Dept: LAB | Age: 66
End: 2021-09-22
Attending: INTERNAL MEDICINE
Payer: MEDICARE

## 2021-09-22 DIAGNOSIS — R31.9 HEMATURIA, UNSPECIFIED TYPE: ICD-10-CM

## 2021-09-22 LAB
BILIRUB UR QL STRIP.AUTO: NEGATIVE
CLARITY UR REFRACT.AUTO: CLEAR
GLUCOSE UR STRIP.AUTO-MCNC: NEGATIVE MG/DL
KETONES UR STRIP.AUTO-MCNC: NEGATIVE MG/DL
LEUKOCYTE ESTERASE UR QL STRIP.AUTO: NEGATIVE
NITRITE UR QL STRIP.AUTO: NEGATIVE
PH UR STRIP.AUTO: 6 [PH] (ref 5–8)
PROT UR STRIP.AUTO-MCNC: NEGATIVE MG/DL
SP GR UR STRIP.AUTO: 1.01 (ref 1–1.03)
UROBILINOGEN UR STRIP.AUTO-MCNC: <2 MG/DL

## 2021-09-22 PROCEDURE — 81001 URINALYSIS AUTO W/SCOPE: CPT

## 2022-01-26 ENCOUNTER — HOSPITAL ENCOUNTER (OUTPATIENT)
Dept: ULTRASOUND IMAGING | Age: 67
Discharge: HOME | End: 2022-01-26
Attending: INTERNAL MEDICINE
Payer: MEDICARE

## 2022-01-26 ENCOUNTER — NURSE ONLY (OUTPATIENT)
Dept: INTERNAL MEDICINE CLINIC | Facility: CLINIC | Age: 67
End: 2022-01-26
Payer: MEDICARE

## 2022-01-26 DIAGNOSIS — R31.9 HEMATURIA, UNSPECIFIED TYPE: ICD-10-CM

## 2022-01-26 DIAGNOSIS — Z00.00 LABORATORY EXAMINATION ORDERED AS PART OF A ROUTINE GENERAL MEDICAL EXAMINATION: Primary | ICD-10-CM

## 2022-01-26 DIAGNOSIS — R73.03 PREDIABETES: ICD-10-CM

## 2022-01-26 LAB
AMB EXT CHLORIDE: 104
AMB EXT CHOL/HDL RATIO: 2.6
AMB EXT CHOLESTEROL, TOTAL: 140 MG/DL
AMB EXT CMP ALT: 31 U/L
AMB EXT CMP AST: 20 U/L
AMB EXT CREATININE: 0.88 MG/DL
AMB EXT GLUCOSE: 124 MG/DL
AMB EXT HDL CHOLESTEROL: 54 MG/DL
AMB EXT HEMATOCRIT: 47.2
AMB EXT HEMOGLOBIN: 15.1
AMB EXT HGBA1C: 6.4 %
AMB EXT LDL CHOLESTEROL, DIRECT: 71 MG/DL
AMB EXT MCV: 82.1
AMB EXT NON HDL CHOL: 86 MG/DL
AMB EXT POSTASSIUM: 4.6 MMOL/L
AMB EXT PSA SCREEN: 1.99 NG/ML
AMB EXT SODIUM: 142 MMOL/L
AMB EXT TRIGLYCERIDES: 66 MG/DL
AMB EXT WBC: 4.8 X10(3)UL
BILIRUB UR QL STRIP.AUTO: NEGATIVE
CLARITY UR REFRACT.AUTO: CLEAR
COLOR UR AUTO: YELLOW
CREAT UR-SCNC: 161 MG/DL
GLUCOSE UR STRIP.AUTO-MCNC: NEGATIVE MG/DL
HYALINE CASTS #/AREA URNS AUTO: PRESENT /LPF
LEUKOCYTE ESTERASE UR QL STRIP.AUTO: NEGATIVE
MICROALBUMIN UR-MCNC: 2.33 MG/DL
MICROALBUMIN/CREAT 24H UR-RTO: 14.5 UG/MG (ref ?–30)
NITRITE UR QL STRIP.AUTO: NEGATIVE
PH UR STRIP.AUTO: 5 [PH] (ref 5–8)
PROT UR STRIP.AUTO-MCNC: NEGATIVE MG/DL
SP GR UR STRIP.AUTO: 1.02 (ref 1–1.03)
UROBILINOGEN UR STRIP.AUTO-MCNC: <2 MG/DL

## 2022-01-26 PROCEDURE — 81001 URINALYSIS AUTO W/SCOPE: CPT | Performed by: INTERNAL MEDICINE

## 2022-01-26 PROCEDURE — 82570 ASSAY OF URINE CREATININE: CPT | Performed by: INTERNAL MEDICINE

## 2022-01-26 PROCEDURE — 82043 UR ALBUMIN QUANTITATIVE: CPT | Performed by: INTERNAL MEDICINE

## 2022-01-26 PROCEDURE — 99173 VISUAL ACUITY SCREEN: CPT | Performed by: INTERNAL MEDICINE

## 2022-01-26 PROCEDURE — 92551 PURE TONE HEARING TEST AIR: CPT | Performed by: INTERNAL MEDICINE

## 2022-01-26 PROCEDURE — 76775 US EXAM ABDO BACK WALL LIM: CPT | Performed by: INTERNAL MEDICINE

## 2022-01-26 PROCEDURE — 93923 UPR/LXTR ART STDY 3+ LVLS: CPT | Performed by: INTERNAL MEDICINE

## 2022-01-26 NOTE — PROGRESS NOTES
*BODY COMPOSITION:    YES:x       NO:       REASON TEST NOT PERFORMED: Redo body comp @ CPE   _____________________________________________________________________________  *VENIPUNCTURE    YES: x      NO:        REASON VENIPUNCTURE NOT PERFORMED:      LEF

## 2022-02-09 ENCOUNTER — OFFICE VISIT (OUTPATIENT)
Dept: INTERNAL MEDICINE CLINIC | Facility: CLINIC | Age: 67
End: 2022-02-09
Payer: MEDICARE

## 2022-02-09 VITALS
WEIGHT: 199 LBS | SYSTOLIC BLOOD PRESSURE: 138 MMHG | DIASTOLIC BLOOD PRESSURE: 90 MMHG | RESPIRATION RATE: 16 BRPM | BODY MASS INDEX: 28.17 KG/M2 | HEIGHT: 70.5 IN | OXYGEN SATURATION: 96 % | HEART RATE: 95 BPM | TEMPERATURE: 98 F

## 2022-02-09 DIAGNOSIS — Z00.00 ANNUAL PHYSICAL EXAM: Primary | ICD-10-CM

## 2022-02-09 DIAGNOSIS — R73.03 PREDIABETES: ICD-10-CM

## 2022-02-09 PROCEDURE — 93000 ELECTROCARDIOGRAM COMPLETE: CPT | Performed by: INTERNAL MEDICINE

## 2022-02-09 PROCEDURE — G0439 PPPS, SUBSEQ VISIT: HCPCS | Performed by: INTERNAL MEDICINE

## 2022-02-12 RX ORDER — BLOOD-GLUCOSE METER
1 EACH MISCELLANEOUS DAILY
Qty: 1 KIT | Refills: 0 | Status: SHIPPED | OUTPATIENT
Start: 2022-02-12 | End: 2023-02-12

## 2022-02-13 PROBLEM — R10.32 ABDOMINAL PAIN, LEFT LOWER QUADRANT: Status: RESOLVED | Noted: 2021-04-02 | Resolved: 2022-02-13

## 2022-02-14 ENCOUNTER — TELEPHONE (OUTPATIENT)
Dept: INTERNAL MEDICINE CLINIC | Facility: CLINIC | Age: 67
End: 2022-02-14

## 2022-02-14 RX ORDER — ALLOPURINOL 100 MG/1
100 TABLET ORAL DAILY
Qty: 90 TABLET | Refills: 3 | Status: SHIPPED | OUTPATIENT
Start: 2022-02-14

## 2022-04-11 ENCOUNTER — LAB ENCOUNTER (OUTPATIENT)
Dept: LAB | Age: 67
End: 2022-04-11
Attending: STUDENT IN AN ORGANIZED HEALTH CARE EDUCATION/TRAINING PROGRAM
Payer: MEDICARE

## 2022-04-11 ENCOUNTER — TELEPHONE (OUTPATIENT)
Dept: INTERNAL MEDICINE CLINIC | Facility: CLINIC | Age: 67
End: 2022-04-11

## 2022-04-11 DIAGNOSIS — D80.2 IGA DEFICIENCY (HCC): ICD-10-CM

## 2022-04-11 DIAGNOSIS — R19.7 DIARRHEA, UNSPECIFIED TYPE: ICD-10-CM

## 2022-04-11 LAB
CRP SERPL-MCNC: <0.29 MG/DL (ref ?–0.3)
IGA SERPL-MCNC: <7.83 MG/DL (ref 70–312)
IGE SERPL-ACNC: <3.6 IU/ML (ref 3.6–114)
IGM SERPL-MCNC: 23.8 MG/DL (ref 43–279)
IMMUNOGLOBULIN PNL SER-MCNC: 1130 MG/DL (ref 791–1643)

## 2022-04-11 PROCEDURE — 86364 TISS TRNSGLTMNASE EA IG CLAS: CPT

## 2022-04-11 PROCEDURE — 86140 C-REACTIVE PROTEIN: CPT

## 2022-04-11 PROCEDURE — 82784 ASSAY IGA/IGD/IGG/IGM EACH: CPT

## 2022-04-11 PROCEDURE — 36415 COLL VENOUS BLD VENIPUNCTURE: CPT

## 2022-04-11 PROCEDURE — 82785 ASSAY OF IGE: CPT

## 2022-04-11 PROCEDURE — 86256 FLUORESCENT ANTIBODY TITER: CPT

## 2022-04-11 NOTE — TELEPHONE ENCOUNTER
Patient has a question about the time between Shingrix shots. He would like to know if it is beneficial to have the second shot ASAP or wait until the end of the recommended time period. Please call.

## 2022-04-13 LAB — TTG IGG SER-ACNC: 1.5 U/ML (ref ?–7)

## 2022-04-15 ENCOUNTER — TELEPHONE (OUTPATIENT)
Dept: HEMATOLOGY/ONCOLOGY | Facility: HOSPITAL | Age: 67
End: 2022-04-15

## 2022-04-15 ENCOUNTER — TELEPHONE (OUTPATIENT)
Dept: INTERNAL MEDICINE CLINIC | Facility: CLINIC | Age: 67
End: 2022-04-15

## 2022-04-15 NOTE — TELEPHONE ENCOUNTER
Patient, Cookie Hassan called and was referred by Dr. Lyndsey Vences to see Dr. Daniele Randolph. Dr. Lyndsey Vences said patient has low immunoglobulins. We need patients in hematology to be scheduled within 5 to 7 days. On 4-25-22 there is a 10;15 a.m. to an 11:00? or maybe PF 4-22-22? Patient's phone number is [phone]. Please advise me. Thank you.

## 2022-04-15 NOTE — PROGRESS NOTES
Pt with IgA, IgM, and IgE def, normal IgG levels. Findings identified incidentally after IgA levels were checked along with Celiac IgA panel. Sent Bridge msg to patient to reach out to PCP regarding next steps.     PM

## 2022-04-25 RX ORDER — LANCETS
EACH MISCELLANEOUS
Qty: 102 EACH | Refills: 3 | Status: SHIPPED | OUTPATIENT
Start: 2022-04-25

## 2022-04-25 RX ORDER — BLOOD SUGAR DIAGNOSTIC
STRIP MISCELLANEOUS
Qty: 100 STRIP | Refills: 3 | Status: SHIPPED | OUTPATIENT
Start: 2022-04-25

## 2022-04-26 RX ORDER — ROSUVASTATIN CALCIUM 5 MG/1
TABLET, COATED ORAL
Qty: 90 TABLET | Refills: 3 | Status: SHIPPED | OUTPATIENT
Start: 2022-04-26

## 2022-04-28 ENCOUNTER — OFFICE VISIT (OUTPATIENT)
Dept: HEMATOLOGY/ONCOLOGY | Facility: HOSPITAL | Age: 67
End: 2022-04-28
Attending: INTERNAL MEDICINE
Payer: MEDICARE

## 2022-04-28 VITALS
DIASTOLIC BLOOD PRESSURE: 95 MMHG | RESPIRATION RATE: 18 BRPM | HEIGHT: 70.98 IN | HEART RATE: 79 BPM | WEIGHT: 187.19 LBS | SYSTOLIC BLOOD PRESSURE: 166 MMHG | OXYGEN SATURATION: 100 % | TEMPERATURE: 98 F | BODY MASS INDEX: 26.21 KG/M2

## 2022-04-28 DIAGNOSIS — D80.4 LOW SERUM IGA AND IGM LEVELS (HCC): Primary | ICD-10-CM

## 2022-04-28 DIAGNOSIS — R76.8 LOW SERUM IGE: ICD-10-CM

## 2022-04-28 DIAGNOSIS — D80.2 LOW SERUM IGA AND IGM LEVELS (HCC): Primary | ICD-10-CM

## 2022-04-28 LAB
ALBUMIN SERPL-MCNC: 4.2 G/DL (ref 3.4–5)
ALBUMIN/GLOB SERPL: 1.3 {RATIO} (ref 1–2)
ALP LIVER SERPL-CCNC: 49 U/L
ALT SERPL-CCNC: 38 U/L
ANION GAP SERPL CALC-SCNC: 1 MMOL/L (ref 0–18)
AST SERPL-CCNC: 17 U/L (ref 15–37)
BASOPHILS # BLD AUTO: 0.05 X10(3) UL (ref 0–0.2)
BASOPHILS NFR BLD AUTO: 0.8 %
BILIRUB SERPL-MCNC: 0.7 MG/DL (ref 0.1–2)
BUN BLD-MCNC: 15 MG/DL (ref 7–18)
CALCIUM BLD-MCNC: 9.6 MG/DL (ref 8.5–10.1)
CHLORIDE SERPL-SCNC: 109 MMOL/L (ref 98–112)
CO2 SERPL-SCNC: 31 MMOL/L (ref 21–32)
CREAT BLD-MCNC: 1 MG/DL
EOSINOPHIL # BLD AUTO: 0.08 X10(3) UL (ref 0–0.7)
EOSINOPHIL NFR BLD AUTO: 1.3 %
ERYTHROCYTE [DISTWIDTH] IN BLOOD BY AUTOMATED COUNT: 14.1 %
FASTING STATUS PATIENT QL REPORTED: NO
GLOBULIN PLAS-MCNC: 3.3 G/DL (ref 2.8–4.4)
GLUCOSE BLD-MCNC: 195 MG/DL (ref 70–99)
HCT VFR BLD AUTO: 43.2 %
HGB BLD-MCNC: 13.1 G/DL
IGA SERPL-MCNC: <7.83 MG/DL (ref 70–312)
IGM SERPL-MCNC: 22.2 MG/DL (ref 43–279)
IMM GRANULOCYTES # BLD AUTO: 0.03 X10(3) UL (ref 0–1)
IMM GRANULOCYTES NFR BLD: 0.5 %
IMMUNOGLOBULIN PNL SER-MCNC: 1140 MG/DL (ref 791–1643)
LDH SERPL L TO P-CCNC: 162 U/L
LYMPHOCYTES # BLD AUTO: 1 X10(3) UL (ref 1–4)
LYMPHOCYTES NFR BLD AUTO: 16.5 %
MCH RBC QN AUTO: 25.8 PG (ref 26–34)
MCHC RBC AUTO-ENTMCNC: 30.3 G/DL (ref 31–37)
MCV RBC AUTO: 85.2 FL
MONOCYTES # BLD AUTO: 0.48 X10(3) UL (ref 0.1–1)
MONOCYTES NFR BLD AUTO: 7.9 %
NEUTROPHILS # BLD AUTO: 4.43 X10 (3) UL (ref 1.5–7.7)
NEUTROPHILS # BLD AUTO: 4.43 X10(3) UL (ref 1.5–7.7)
NEUTROPHILS NFR BLD AUTO: 73 %
OSMOLALITY SERPL CALC.SUM OF ELEC: 298 MOSM/KG (ref 275–295)
PLATELET # BLD AUTO: 231 10(3)UL (ref 150–450)
POTASSIUM SERPL-SCNC: 4 MMOL/L (ref 3.5–5.1)
PROT SERPL-MCNC: 7.5 G/DL (ref 6.4–8.2)
RBC # BLD AUTO: 5.07 X10(6)UL
SODIUM SERPL-SCNC: 141 MMOL/L (ref 136–145)
WBC # BLD AUTO: 6.1 X10(3) UL (ref 4–11)

## 2022-04-28 PROCEDURE — 99205 OFFICE O/P NEW HI 60 MIN: CPT | Performed by: INTERNAL MEDICINE

## 2022-04-28 NOTE — PROGRESS NOTES
Patient is here for consultation for some low immunoglobulin levels. Dr. Bishop Lozano recommended that he see Dr. Richard Le. He has seen Dr. Jimmie Camilo for endoscopies and is treating him with pantoprazole. He had some polyps removed on colonoscopy. In February he had some explosive diarrhea and returned to see Dr. Jimmie Camilo in March. He had changed his diet to relieve the diarrhea and this has resolved. Dr. Jimmie Camilo had ordered blood and stool tests. He reports that he feels well and his energy is pretty good. He is active. He denies shortness of breath. He denies any fever or sweats. He denies recurrent infections. His diet is good once he changed this for the diarrhea. He gave up snacks and sweets for [facility] and he has lost 12 # during that time. He has had urology and prostate evaluations routinely. He had fluid on his left knee in 1992 and had some genetic testing done that indicated alpha thalassemia trait. (he provided a copy of this). He has a family history of auto immune disease but he has not been diagnosed with anything himself. His younger sister has recently been diagnosed with lymphoma.     Education Record    Learner:  Patient    Disease / Diagnosis: low immunoglobulin levels    Barriers / Limitations:  None   Comments:    Method:  Discussion   Comments:    General Topics:  Side effects and symptom management   Comments:    Outcome:  Shows understanding   Comments:

## 2022-04-29 ENCOUNTER — MED REC SCAN ONLY (OUTPATIENT)
Dept: INTERNAL MEDICINE CLINIC | Facility: CLINIC | Age: 67
End: 2022-04-29

## 2022-05-03 LAB
ALBUMIN SERPL ELPH-MCNC: 4.67 G/DL (ref 3.75–5.21)
ALBUMIN/GLOB SERPL: 1.78 {RATIO} (ref 1–2)
ALPHA1 GLOB SERPL ELPH-MCNC: 0.22 G/DL (ref 0.19–0.46)
ALPHA2 GLOB SERPL ELPH-MCNC: 0.69 G/DL (ref 0.48–1.05)
B-GLOBULIN SERPL ELPH-MCNC: 0.62 G/DL (ref 0.68–1.23)
GAMMA GLOB SERPL ELPH-MCNC: 1.1 G/DL (ref 0.62–1.7)
KAPPA LC FREE SER-MCNC: 1.13 MG/DL (ref 0.33–1.94)
KAPPA LC FREE/LAMBDA FREE SER NEPH: 1.23 {RATIO} (ref 0.26–1.65)
LAMBDA LC FREE SERPL-MCNC: 0.92 MG/DL (ref 0.57–2.63)
PROT SERPL-MCNC: 7.3 G/DL (ref 6.4–8.2)

## 2022-05-23 ENCOUNTER — PATIENT MESSAGE (OUTPATIENT)
Dept: INTERNAL MEDICINE CLINIC | Facility: CLINIC | Age: 67
End: 2022-05-23

## 2022-05-23 ENCOUNTER — TELEPHONE (OUTPATIENT)
Dept: SURGERY | Facility: CLINIC | Age: 67
End: 2022-05-23

## 2022-05-23 NOTE — TELEPHONE ENCOUNTER
RN received a message from Dr Phani Douglas to offer patient an appt in 2 weeks. RN offered a consult appt. Note, this patient has hx of enlarged prostate. Seen Dr Lula Mckenna and Angelo was placed on 5/20. He also has an appt with PA on Friday, 5/27. RN suggested to keep appt with them, but patient still wishes to see Dr Phani Douglas for second opinion. He reports blood in urine and discomfort with penis with each movement. He hold Eliquis last night. RN encouraged increase fluid intake. Sip after 6pm, use of well-fitted underwear and to follow up with Dr Sosa Gracia RN to check catheter. Angelo care instructions given. All questions answered and he agreed to plans.

## 2022-05-23 NOTE — TELEPHONE ENCOUNTER
From: Norris Hassan  To: Romi Jarrell MD  Sent: 5/23/2022 10:42 AM CDT  Subject: getting caught up    A lot has happened with me medically in the last few months, and I set a call with you at 3:45 pm tomorrow to discuss it:  1. On Friday, May 20, I was unable to urinate, presumably because my prostate was enlarged. I called [facility] office and they got me in to see a PA Donnie). I have a catheter in now and they said [facility] didn't have an opening until late July, so they set me up on Cammie 10 with Dr Chrissy Piedra. Related to this topic:  a. I asked to be on a wait list for a sooner visit with Nestor Carranza or Chrissy Piedra. Anything that you can do to get me in sooner would be very helpful and important to me. Having a catheter is disruptive to me and more importantly concerns my risk of infection and interrelationship with my blood immune matter and my mitral valve repair and being on Eliquis. b. I am having some minor blood in the urine with the catheter. I have advised [facility] nurse who was not alarmed. c. Your view on another urologist that I could see sooner   and get a second opinion would be very valuable. 2. Fortunately, the results from the tests that [facility] ordered were all negative. Prayers answered there! I have not spoken with him or you since those results. I think Lelo Graves said that if negative, he might just wait and see for 6 months. .. or possibly refer me to an allergist/immuno(?) manny. .. but more inclined to do nothing? I am ok with \"doing nothing\", especially with my prostate issues now. But I wanted to follow up.

## 2022-05-24 ENCOUNTER — VIRTUAL PHONE E/M (OUTPATIENT)
Dept: INTERNAL MEDICINE CLINIC | Facility: CLINIC | Age: 67
End: 2022-05-24
Payer: MEDICARE

## 2022-05-24 DIAGNOSIS — R33.8 ACUTE URINARY RETENTION: Primary | ICD-10-CM

## 2022-05-24 RX ORDER — DOXYCYCLINE HYCLATE 100 MG
100 TABLET ORAL 2 TIMES DAILY
Qty: 30 TABLET | Refills: 1 | Status: SHIPPED | OUTPATIENT
Start: 2022-05-24

## 2022-05-24 NOTE — PROGRESS NOTES
Virtual Telephone Check-In    Janine Luna verbally consents to a Virtual/Telephone Check-In visit on 05/24/22. Patient has been referred to the [facility] website at [email or web address] to review the yearly Consent to Treat document. Patient understands and accepts financial responsibility for any deductible, co-insurance and/or co-pays associated with this service. Duration of the service: 15 minutes audio only      Summary of topics discussed:     Acute urinary retention-has Angelo and refugio Rojas's office. Add Doxy to protect AVR (numerous allergies). Asked for 2nd opinion-given [place]. Traces of blood in urine-expected with Eliquis.        Ramses Wilson MD

## 2022-06-03 ENCOUNTER — OFFICE VISIT (OUTPATIENT)
Dept: SURGERY | Facility: CLINIC | Age: 67
End: 2022-06-03
Payer: COMMERCIAL

## 2022-06-03 ENCOUNTER — LAB ENCOUNTER (OUTPATIENT)
Dept: LAB | Age: 67
End: 2022-06-03
Attending: INTERNAL MEDICINE
Payer: MEDICARE

## 2022-06-03 DIAGNOSIS — R33.9 URINARY RETENTION: Primary | ICD-10-CM

## 2022-06-03 DIAGNOSIS — R73.03 PREDIABETES: ICD-10-CM

## 2022-06-03 DIAGNOSIS — R39.9 LOWER URINARY TRACT SYMPTOMS: ICD-10-CM

## 2022-06-03 DIAGNOSIS — N20.0 KIDNEY STONE: ICD-10-CM

## 2022-06-03 DIAGNOSIS — N40.0 ENLARGED PROSTATE: ICD-10-CM

## 2022-06-03 LAB
EST. AVERAGE GLUCOSE BLD GHB EST-MCNC: 131 MG/DL (ref 68–126)
HBA1C MFR BLD: 6.2 % (ref ?–5.7)
URATE SERPL-MCNC: 5.6 MG/DL

## 2022-06-03 PROCEDURE — 84550 ASSAY OF BLOOD/URIC ACID: CPT

## 2022-06-03 PROCEDURE — 99203 OFFICE O/P NEW LOW 30 MIN: CPT | Performed by: UROLOGY

## 2022-06-03 PROCEDURE — 83036 HEMOGLOBIN GLYCOSYLATED A1C: CPT

## 2022-06-03 PROCEDURE — 36415 COLL VENOUS BLD VENIPUNCTURE: CPT

## 2022-06-06 ENCOUNTER — NURSE ONLY (OUTPATIENT)
Dept: SURGERY | Facility: CLINIC | Age: 67
End: 2022-06-06
Payer: COMMERCIAL

## 2022-06-06 ENCOUNTER — TELEPHONE (OUTPATIENT)
Dept: SURGERY | Facility: CLINIC | Age: 67
End: 2022-06-06

## 2022-06-06 DIAGNOSIS — R33.9 URINARY RETENTION: Primary | ICD-10-CM

## 2022-06-06 NOTE — PROGRESS NOTES
Patient unable to void since barcenas out this am.  PVR 618ml. Per Dr Tracie Adams, re insert 16f barcenas. called the pt into the exam room and I introduced myself and verified the pt's name and . I then prepped and draped the pt in sterile fashion and I proceeded to instill about 2/3 the amt of a Urojet (Lidocaine HCL 2 % gel- 10 ml) into the pt's urethra. I then constricted the head of the penis for about 1-2 min to prevent the gel from leaking out and to also allow sufficient numbing time before the cath placement. After the time elapsed I then proceeded to  insert a new 16 FR barcenas catheter without difficulty and received a urine return of about 620 ml of clear yellow urine. Pt tolerated this well also. I also placed the barcenas cath in a leg strap, pt did not want stat lock and I secured it to pt's left Upper inner thigh. I then secured the drainage tubing and leg bag to the pt's left upper leg with the velcro straps. I then reviewed the use of aseptic technique when performing catheter care and when changing over to the night drainage bag. I then assisted the pt off the exam table and to redress. Pt verbalized understanding and compliance. Patient to return thurs or Fri to discuss how he would like to proceed.

## 2022-06-06 NOTE — TELEPHONE ENCOUNTER
Pt calling states barcenas was removed this morning and still can't urinate states very uncomfortable and wants to come back in now instead of 4pm please advise

## 2022-06-06 NOTE — PROGRESS NOTES
I called the pt into the exam room and I introduced myself and verified the pt's name and . I explained that I will be removing his existing bracenas cath. I then assisted the pt onto the exam table and also to lower his bottom clothing to his ankles. I then placed the pt on the exam table in supine position. I then proceeded to detach his existing leg bag and extension tubing from his Lt lower leg and then detached the cath from the STAT lock. I then deflated the barcenas cath balloon of it's contents and I gently and slowly removed the barcenas cath without difficulty and pt tolerated it well. Explained to the pt that he needs to increase his water intake for the next couple days. He will return to the office today at 4pm or sooner for PVR. Pt verbalized understanding and compliance.

## 2022-06-07 ENCOUNTER — TELEPHONE (OUTPATIENT)
Dept: SURGERY | Facility: CLINIC | Age: 67
End: 2022-06-07

## 2022-06-07 DIAGNOSIS — R33.9 URINARY RETENTION: Primary | ICD-10-CM

## 2022-06-07 DIAGNOSIS — N40.0 ENLARGED PROSTATE: ICD-10-CM

## 2022-06-07 DIAGNOSIS — R39.9 LOWER URINARY TRACT SYMPTOMS: ICD-10-CM

## 2022-06-07 NOTE — TELEPHONE ENCOUNTER
Holding surgical time for patient on 6/28/22 at 7:30 a.m.until he sees doctor. Notified patient of this in case the hospital reaches out to him.

## 2022-06-08 ENCOUNTER — PATIENT MESSAGE (OUTPATIENT)
Dept: INTERNAL MEDICINE CLINIC | Facility: CLINIC | Age: 67
End: 2022-06-08

## 2022-06-08 ENCOUNTER — TELEPHONE (OUTPATIENT)
Dept: SURGERY | Facility: CLINIC | Age: 67
End: 2022-06-08

## 2022-06-08 NOTE — TELEPHONE ENCOUNTER
----- Message from Frankey List, MD sent at 6/7/2022  9:33 AM CDT -----  Your recent urine culture shows no growth and is normal.  Recommend follow up in the office as directed.     Sincerely,  Manley Sicard, MD

## 2022-06-09 ENCOUNTER — OFFICE VISIT (OUTPATIENT)
Dept: SURGERY | Facility: CLINIC | Age: 67
End: 2022-06-09
Payer: MEDICARE

## 2022-06-09 DIAGNOSIS — N40.0 ENLARGED PROSTATE: ICD-10-CM

## 2022-06-09 DIAGNOSIS — R33.9 URINARY RETENTION: Primary | ICD-10-CM

## 2022-06-09 PROCEDURE — 99213 OFFICE O/P EST LOW 20 MIN: CPT | Performed by: UROLOGY

## 2022-06-09 RX ORDER — DOXYCYCLINE HYCLATE 100 MG/1
CAPSULE ORAL
COMMUNITY
Start: 2022-06-07

## 2022-06-16 ENCOUNTER — TELEPHONE (OUTPATIENT)
Dept: SURGERY | Facility: CLINIC | Age: 67
End: 2022-06-16

## 2022-06-16 DIAGNOSIS — N40.0 ENLARGED PROSTATE: Primary | ICD-10-CM

## 2022-06-16 NOTE — TELEPHONE ENCOUNTER
RN called patientAbhay [phone]  in response to his call. Wishes to cancel the procedure (TURP) scheduled on 6/28. RN encouraged patient to call office if he later change his mind or would like to discuss about the procedure again with Dr Eboni Xavier. He agreed to plans and verbalized understanding. RN called Mera Clinton, Surgery Scheduler to cancel surgery.

## 2022-06-17 ENCOUNTER — TELEPHONE (OUTPATIENT)
Dept: INTERNAL MEDICINE CLINIC | Facility: CLINIC | Age: 67
End: 2022-06-17

## 2022-06-17 ENCOUNTER — TELEPHONE (OUTPATIENT)
Dept: SURGERY | Facility: CLINIC | Age: 67
End: 2022-06-17

## 2022-06-17 NOTE — TELEPHONE ENCOUNTER
Patient called and stated that he is having upcoming surgery on 06/24 for his prostate. He would like to speak with the doctor about eliquis.

## 2022-06-17 NOTE — TELEPHONE ENCOUNTER
Please cancel surgery if patient had scheduled here. He is going to have a [facility]LE[facility] at [facility].

## 2022-06-22 ENCOUNTER — TELEPHONE (OUTPATIENT)
Dept: INTERNAL MEDICINE CLINIC | Facility: CLINIC | Age: 67
End: 2022-06-22

## 2022-06-22 RX ORDER — BLOOD SUGAR DIAGNOSTIC
STRIP MISCELLANEOUS
Qty: 100 EACH | Refills: 3 | Status: SHIPPED | OUTPATIENT
Start: 2022-06-22

## 2022-06-22 NOTE — TELEPHONE ENCOUNTER
Phone call to [facility] provided diagnosis code of pre-diabetes. Does not pass Medicare requirements. Patient will be self pay for testing materials.

## 2022-06-22 NOTE — TELEPHONE ENCOUNTER
[facility] pharmacy is calling to let the nurse know that the prescription for Accu check is requiring a diagnosis code. Please call Diana Kramer at [phone]. Prescription # [id number].

## 2022-07-01 ENCOUNTER — LAB ENCOUNTER (OUTPATIENT)
Dept: LAB | Age: 67
End: 2022-07-01
Attending: INTERNAL MEDICINE
Payer: MEDICARE

## 2022-07-01 ENCOUNTER — OFFICE VISIT (OUTPATIENT)
Dept: INTERNAL MEDICINE CLINIC | Facility: CLINIC | Age: 67
End: 2022-07-01
Payer: MEDICARE

## 2022-07-01 VITALS
SYSTOLIC BLOOD PRESSURE: 134 MMHG | RESPIRATION RATE: 18 BRPM | TEMPERATURE: 98 F | HEART RATE: 74 BPM | WEIGHT: 174 LBS | OXYGEN SATURATION: 96 % | BODY MASS INDEX: 24 KG/M2 | DIASTOLIC BLOOD PRESSURE: 82 MMHG

## 2022-07-01 DIAGNOSIS — R31.9 HEMATURIA, UNSPECIFIED TYPE: ICD-10-CM

## 2022-07-01 DIAGNOSIS — N13.8 BENIGN PROSTATIC HYPERPLASIA WITH URINARY OBSTRUCTION: Primary | ICD-10-CM

## 2022-07-01 DIAGNOSIS — R73.03 PREDIABETES: ICD-10-CM

## 2022-07-01 DIAGNOSIS — D64.9 POSTOPERATIVE ANEMIA: ICD-10-CM

## 2022-07-01 DIAGNOSIS — N40.1 BENIGN PROSTATIC HYPERPLASIA WITH URINARY OBSTRUCTION: Primary | ICD-10-CM

## 2022-07-01 DIAGNOSIS — R31.0 GROSS HEMATURIA: ICD-10-CM

## 2022-07-01 DIAGNOSIS — G47.33 OSA (OBSTRUCTIVE SLEEP APNEA): ICD-10-CM

## 2022-07-01 DIAGNOSIS — N02.9 RECURRENT AND PERSISTENT HEMATURIA WITH UNSPECIFIED MORPHOLOGIC CHANGES: ICD-10-CM

## 2022-07-01 PROBLEM — N20.0 CALCULUS OF KIDNEY: Status: ACTIVE | Noted: 2022-06-24

## 2022-07-01 LAB
BASOPHILS # BLD AUTO: 0.07 X10(3) UL (ref 0–0.2)
BASOPHILS NFR BLD AUTO: 0.8 %
BILIRUB UR QL STRIP.AUTO: NEGATIVE
EOSINOPHIL # BLD AUTO: 0.36 X10(3) UL (ref 0–0.7)
EOSINOPHIL NFR BLD AUTO: 4.3 %
ERYTHROCYTE [DISTWIDTH] IN BLOOD BY AUTOMATED COUNT: 14.4 %
EST. AVERAGE GLUCOSE BLD GHB EST-MCNC: 128 MG/DL (ref 68–126)
GLUCOSE UR STRIP.AUTO-MCNC: NEGATIVE MG/DL
HBA1C MFR BLD: 6.1 % (ref ?–5.7)
HCT VFR BLD AUTO: 37.4 %
HGB BLD-MCNC: 11.8 G/DL
IMM GRANULOCYTES # BLD AUTO: 0.06 X10(3) UL (ref 0–1)
IMM GRANULOCYTES NFR BLD: 0.7 %
IRON SATN MFR SERPL: 14 %
IRON SERPL-MCNC: 34 UG/DL
KETONES UR STRIP.AUTO-MCNC: NEGATIVE MG/DL
LYMPHOCYTES # BLD AUTO: 1.61 X10(3) UL (ref 1–4)
LYMPHOCYTES NFR BLD AUTO: 19.1 %
MCH RBC QN AUTO: 26.7 PG (ref 26–34)
MCHC RBC AUTO-ENTMCNC: 31.6 G/DL (ref 31–37)
MCV RBC AUTO: 84.6 FL
MONOCYTES # BLD AUTO: 0.78 X10(3) UL (ref 0.1–1)
MONOCYTES NFR BLD AUTO: 9.3 %
NEUTROPHILS # BLD AUTO: 5.53 X10 (3) UL (ref 1.5–7.7)
NEUTROPHILS # BLD AUTO: 5.53 X10(3) UL (ref 1.5–7.7)
NEUTROPHILS NFR BLD AUTO: 65.8 %
NITRITE UR QL STRIP.AUTO: NEGATIVE
PH UR STRIP.AUTO: 6 [PH] (ref 5–8)
PLATELET # BLD AUTO: 271 10(3)UL (ref 150–450)
PROT UR STRIP.AUTO-MCNC: 100 MG/DL
RBC # BLD AUTO: 4.42 X10(6)UL
RBC #/AREA URNS AUTO: >10 /HPF
SP GR UR STRIP.AUTO: 1.02 (ref 1–1.03)
TIBC SERPL-MCNC: 237 UG/DL (ref 240–450)
TRANSFERRIN SERPL-MCNC: 159 MG/DL (ref 200–360)
UROBILINOGEN UR STRIP.AUTO-MCNC: <2 MG/DL
WBC # BLD AUTO: 8.4 X10(3) UL (ref 4–11)
WBC #/AREA URNS AUTO: >50 /HPF

## 2022-07-01 PROCEDURE — 36415 COLL VENOUS BLD VENIPUNCTURE: CPT

## 2022-07-01 PROCEDURE — 87086 URINE CULTURE/COLONY COUNT: CPT

## 2022-07-01 PROCEDURE — 85025 COMPLETE CBC W/AUTO DIFF WBC: CPT

## 2022-07-01 PROCEDURE — 83036 HEMOGLOBIN GLYCOSYLATED A1C: CPT

## 2022-07-01 PROCEDURE — 81001 URINALYSIS AUTO W/SCOPE: CPT

## 2022-07-01 PROCEDURE — 83550 IRON BINDING TEST: CPT

## 2022-07-01 PROCEDURE — 83540 ASSAY OF IRON: CPT

## 2022-07-08 DIAGNOSIS — K64.8 HEMORRHOIDS, INTERNAL: Primary | ICD-10-CM

## 2022-07-08 DIAGNOSIS — E61.1 IRON DEFICIENCY: ICD-10-CM

## 2022-07-18 ENCOUNTER — OFFICE VISIT (OUTPATIENT)
Dept: INTERNAL MEDICINE CLINIC | Facility: CLINIC | Age: 67
End: 2022-07-18
Payer: MEDICARE

## 2022-07-18 ENCOUNTER — LAB ENCOUNTER (OUTPATIENT)
Dept: LAB | Age: 67
End: 2022-07-18
Attending: INTERNAL MEDICINE
Payer: MEDICARE

## 2022-07-18 VITALS
TEMPERATURE: 99 F | RESPIRATION RATE: 18 BRPM | DIASTOLIC BLOOD PRESSURE: 86 MMHG | WEIGHT: 175 LBS | OXYGEN SATURATION: 96 % | BODY MASS INDEX: 25 KG/M2 | SYSTOLIC BLOOD PRESSURE: 142 MMHG | HEART RATE: 82 BPM

## 2022-07-18 DIAGNOSIS — R31.9 HEMATURIA, UNSPECIFIED TYPE: ICD-10-CM

## 2022-07-18 DIAGNOSIS — D64.9 ANEMIA, UNSPECIFIED TYPE: ICD-10-CM

## 2022-07-18 DIAGNOSIS — E61.1 IRON DEFICIENCY: ICD-10-CM

## 2022-07-18 DIAGNOSIS — R91.1 LUNG NODULE: Primary | ICD-10-CM

## 2022-07-18 LAB
BASOPHILS # BLD AUTO: 0.06 X10(3) UL (ref 0–0.2)
BASOPHILS NFR BLD AUTO: 0.8 %
BILIRUBIN: NEGATIVE
EOSINOPHIL # BLD AUTO: 0.2 X10(3) UL (ref 0–0.7)
EOSINOPHIL NFR BLD AUTO: 2.5 %
ERYTHROCYTE [DISTWIDTH] IN BLOOD BY AUTOMATED COUNT: 14.6 %
GLUCOSE (URINE DIPSTICK): NEGATIVE MG/DL
HCT VFR BLD AUTO: 38.7 %
HGB BLD-MCNC: 12.1 G/DL
IMM GRANULOCYTES # BLD AUTO: 0.04 X10(3) UL (ref 0–1)
IMM GRANULOCYTES NFR BLD: 0.5 %
IRON SATN MFR SERPL: 25 %
IRON SERPL-MCNC: 72 UG/DL
KETONES (URINE DIPSTICK): NEGATIVE MG/DL
LYMPHOCYTES # BLD AUTO: 1.67 X10(3) UL (ref 1–4)
LYMPHOCYTES NFR BLD AUTO: 21 %
MCH RBC QN AUTO: 26.6 PG (ref 26–34)
MCHC RBC AUTO-ENTMCNC: 31.3 G/DL (ref 31–37)
MCV RBC AUTO: 85.1 FL
MONOCYTES # BLD AUTO: 0.61 X10(3) UL (ref 0.1–1)
MONOCYTES NFR BLD AUTO: 7.7 %
MULTISTIX LOT#: ABNORMAL NUMERIC
NEUTROPHILS # BLD AUTO: 5.36 X10 (3) UL (ref 1.5–7.7)
NEUTROPHILS # BLD AUTO: 5.36 X10(3) UL (ref 1.5–7.7)
NEUTROPHILS NFR BLD AUTO: 67.5 %
NITRITE, URINE: NEGATIVE
PH, URINE: 5.5 (ref 4.5–8)
PLATELET # BLD AUTO: 287 10(3)UL (ref 150–450)
PROTEIN (URINE DIPSTICK): 100 MG/DL
RBC # BLD AUTO: 4.55 X10(6)UL
SPECIFIC GRAVITY: 1.02 (ref 1–1.03)
TIBC SERPL-MCNC: 285 UG/DL (ref 240–450)
TRANSFERRIN SERPL-MCNC: 191 MG/DL (ref 200–360)
UROBILINOGEN,SEMI-QN: 0.2 MG/DL (ref 0–1.9)
WBC # BLD AUTO: 7.9 X10(3) UL (ref 4–11)

## 2022-07-18 PROCEDURE — 85025 COMPLETE CBC W/AUTO DIFF WBC: CPT

## 2022-07-18 PROCEDURE — 87086 URINE CULTURE/COLONY COUNT: CPT | Performed by: INTERNAL MEDICINE

## 2022-07-18 PROCEDURE — 83540 ASSAY OF IRON: CPT

## 2022-07-18 PROCEDURE — 83550 IRON BINDING TEST: CPT

## 2022-07-18 PROCEDURE — 36415 COLL VENOUS BLD VENIPUNCTURE: CPT

## 2022-07-19 DIAGNOSIS — D64.9 ANEMIA, UNSPECIFIED TYPE: Primary | ICD-10-CM

## 2022-07-28 ENCOUNTER — TELEPHONE (OUTPATIENT)
Dept: INTERNAL MEDICINE CLINIC | Facility: CLINIC | Age: 67
End: 2022-07-28

## 2022-07-28 NOTE — TELEPHONE ENCOUNTER
Per epic pt had an A1C% of 6.5% dated 12/30/19. Please advise if pt is consider to have a dx of DMII or prediabetic only.

## 2022-07-28 NOTE — TELEPHONE ENCOUNTER
Juancarlos Rodriguez from [address] [facility] is calling regarding the patient's diabetic prescriptions. Pharmacist needs to discuss with the nurse if the patient is pre diabetic or diagnosed with diabetes.

## 2022-07-29 NOTE — TELEPHONE ENCOUNTER
Pharmacist informed and stated Medicare coverage not granted for prediabetes dx.  Pt paid out of pocket, about $120

## 2022-08-10 ENCOUNTER — OFFICE VISIT (OUTPATIENT)
Dept: SLEEP CENTER | Age: 67
End: 2022-08-10
Attending: INTERNAL MEDICINE
Payer: MEDICARE

## 2022-08-10 DIAGNOSIS — G47.33 OSA (OBSTRUCTIVE SLEEP APNEA): ICD-10-CM

## 2022-08-10 PROCEDURE — 95810 POLYSOM 6/> YRS 4/> PARAM: CPT

## 2022-08-17 ENCOUNTER — LAB ENCOUNTER (OUTPATIENT)
Dept: LAB | Age: 67
End: 2022-08-17
Attending: INTERNAL MEDICINE
Payer: MEDICARE

## 2022-08-17 ENCOUNTER — OFFICE VISIT (OUTPATIENT)
Dept: INTERNAL MEDICINE CLINIC | Facility: CLINIC | Age: 67
End: 2022-08-17
Payer: MEDICARE

## 2022-08-17 VITALS
SYSTOLIC BLOOD PRESSURE: 156 MMHG | OXYGEN SATURATION: 96 % | TEMPERATURE: 98 F | HEIGHT: 70 IN | BODY MASS INDEX: 25.91 KG/M2 | RESPIRATION RATE: 16 BRPM | DIASTOLIC BLOOD PRESSURE: 80 MMHG | HEART RATE: 68 BPM | WEIGHT: 181 LBS

## 2022-08-17 DIAGNOSIS — D84.9 IMMUNODEFICIENCY (HCC): ICD-10-CM

## 2022-08-17 DIAGNOSIS — M47.812 CERVICAL ARTHRITIS: ICD-10-CM

## 2022-08-17 DIAGNOSIS — D80.4 LOW SERUM IGA AND IGM LEVELS (HCC): ICD-10-CM

## 2022-08-17 DIAGNOSIS — D80.2 LOW SERUM IGA AND IGM LEVELS (HCC): ICD-10-CM

## 2022-08-17 DIAGNOSIS — I10 ESSENTIAL HYPERTENSION: Primary | ICD-10-CM

## 2022-08-17 DIAGNOSIS — D80.2 IGA DEFICIENCY (HCC): Primary | ICD-10-CM

## 2022-08-17 DIAGNOSIS — D64.9 ANEMIA, UNSPECIFIED TYPE: ICD-10-CM

## 2022-08-17 DIAGNOSIS — E61.1 IRON DEFICIENCY: ICD-10-CM

## 2022-08-17 LAB
BASOPHILS # BLD AUTO: 0.05 X10(3) UL (ref 0–0.2)
BASOPHILS NFR BLD AUTO: 0.7 %
C3 SERPL-MCNC: 135 MG/DL (ref 90–180)
C4 SERPL-MCNC: 30.6 MG/DL (ref 10–40)
EOSINOPHIL # BLD AUTO: 0.15 X10(3) UL (ref 0–0.7)
EOSINOPHIL NFR BLD AUTO: 2.2 %
ERYTHROCYTE [DISTWIDTH] IN BLOOD BY AUTOMATED COUNT: 14.9 %
HCT VFR BLD AUTO: 40.8 %
HGB BLD-MCNC: 12.6 G/DL
IGA SERPL-MCNC: <7.83 MG/DL (ref 70–312)
IGE SERPL-ACNC: <3.6 IU/ML (ref 3.6–114)
IGM SERPL-MCNC: 22.8 MG/DL (ref 43–279)
IMM GRANULOCYTES # BLD AUTO: 0.04 X10(3) UL (ref 0–1)
IMM GRANULOCYTES NFR BLD: 0.6 %
IMMUNOGLOBULIN PNL SER-MCNC: 994 MG/DL (ref 791–1643)
IRON SATN MFR SERPL: 19 %
IRON SERPL-MCNC: 63 UG/DL
LYMPHOCYTES # BLD AUTO: 1.25 X10(3) UL (ref 1–4)
LYMPHOCYTES NFR BLD AUTO: 18 %
MCH RBC QN AUTO: 26.3 PG (ref 26–34)
MCHC RBC AUTO-ENTMCNC: 30.9 G/DL (ref 31–37)
MCV RBC AUTO: 85 FL
MONOCYTES # BLD AUTO: 0.66 X10(3) UL (ref 0.1–1)
MONOCYTES NFR BLD AUTO: 9.5 %
NEUTROPHILS # BLD AUTO: 4.78 X10 (3) UL (ref 1.5–7.7)
NEUTROPHILS # BLD AUTO: 4.78 X10(3) UL (ref 1.5–7.7)
NEUTROPHILS NFR BLD AUTO: 69 %
PLATELET # BLD AUTO: 254 10(3)UL (ref 150–450)
RBC # BLD AUTO: 4.8 X10(6)UL
RUBV IGG SER QL: POSITIVE
RUBV IGG SER-ACNC: >500 IU/ML (ref 10–?)
TIBC SERPL-MCNC: 334 UG/DL (ref 240–450)
TRANSFERRIN SERPL-MCNC: 224 MG/DL (ref 200–360)
WBC # BLD AUTO: 6.9 X10(3) UL (ref 4–11)

## 2022-08-17 PROCEDURE — 86762 RUBELLA ANTIBODY: CPT

## 2022-08-17 PROCEDURE — 86162 COMPLEMENT TOTAL (CH50): CPT

## 2022-08-17 PROCEDURE — 83550 IRON BINDING TEST: CPT

## 2022-08-17 PROCEDURE — 82787 IGG 1 2 3 OR 4 EACH: CPT

## 2022-08-17 PROCEDURE — 99213 OFFICE O/P EST LOW 20 MIN: CPT | Performed by: INTERNAL MEDICINE

## 2022-08-17 PROCEDURE — 86769 SARS-COV-2 COVID-19 ANTIBODY: CPT

## 2022-08-17 PROCEDURE — 86160 COMPLEMENT ANTIGEN: CPT

## 2022-08-17 PROCEDURE — 86941 HEMOLYSINS/AGGLUTININS: CPT

## 2022-08-17 PROCEDURE — 86900 BLOOD TYPING SEROLOGIC ABO: CPT

## 2022-08-17 PROCEDURE — 82785 ASSAY OF IGE: CPT

## 2022-08-17 PROCEDURE — 85025 COMPLETE CBC W/AUTO DIFF WBC: CPT

## 2022-08-17 PROCEDURE — 36415 COLL VENOUS BLD VENIPUNCTURE: CPT

## 2022-08-17 PROCEDURE — 83540 ASSAY OF IRON: CPT

## 2022-08-17 PROCEDURE — 86317 IMMUNOASSAY INFECTIOUS AGENT: CPT

## 2022-08-17 PROCEDURE — 82784 ASSAY IGA/IGD/IGG/IGM EACH: CPT

## 2022-08-18 DIAGNOSIS — E61.1 IRON DEFICIENCY: Primary | ICD-10-CM

## 2022-08-18 RX ORDER — CHOLECALCIFEROL (VITAMIN D3) 10(400)/ML
160 DROPS ORAL EVERY MORNING
Qty: 30 TABLET | Refills: 3 | COMMUNITY
Start: 2022-08-18

## 2022-08-19 LAB
COMPLEMENT ACTIVITY, TOTAL EIA: 77.9 U/ML
ISO ANTI A TITIER IGG: 128
ISO ANTI A TITIER IGM: 64
ISO ANTI B TITIER IGG: 128
ISO ANTI B TITIER IGM: 32

## 2022-08-20 LAB
IMMUNOGLOBULIN G SUBCLASS 1: 620 MG/DL
IMMUNOGLOBULIN G SUBCLASS 2: 306 MG/DL
IMMUNOGLOBULIN G SUBCLASS 3: 29 MG/DL
IMMUNOGLOBULIN G SUBCLASS 4: 14 MG/DL

## 2022-08-21 LAB
PNEUMOCOCCAL SEROTYPE 1 IGG: 4.12 UG/ML
PNEUMOCOCCAL SEROTYPE 10A IGG: 4.16 UG/ML
PNEUMOCOCCAL SEROTYPE 11A IGG: 1.22 UG/ML
PNEUMOCOCCAL SEROTYPE 12F IGG: 0.16 UG/ML
PNEUMOCOCCAL SEROTYPE 14* IGG: 17.22 UG/ML
PNEUMOCOCCAL SEROTYPE 15B IGG: 0.77 UG/ML
PNEUMOCOCCAL SEROTYPE 17F IGG: 5.34 UG/ML
PNEUMOCOCCAL SEROTYPE 18C* IGG: 2.16 UG/ML
PNEUMOCOCCAL SEROTYPE 19A IGG: 16.76 UG/ML
PNEUMOCOCCAL SEROTYPE 19F* IGG: 1.69 UG/ML
PNEUMOCOCCAL SEROTYPE 2 IGG: 0.24 UG/ML
PNEUMOCOCCAL SEROTYPE 20 IGG: 2.3 UG/ML
PNEUMOCOCCAL SEROTYPE 22F IGG: 0.35 UG/ML
PNEUMOCOCCAL SEROTYPE 23F* IGG: 0.85 UG/ML
PNEUMOCOCCAL SEROTYPE 3 IGG: 1.34 UG/ML
PNEUMOCOCCAL SEROTYPE 33F IGG: 0.26 UG/ML
PNEUMOCOCCAL SEROTYPE 4* IGG: 0.42 UG/ML
PNEUMOCOCCAL SEROTYPE 5 IGG: 0.35 UG/ML
PNEUMOCOCCAL SEROTYPE 6B* IGG: 0.47 UG/ML
PNEUMOCOCCAL SEROTYPE 7F IGG: 0.39 UG/ML
PNEUMOCOCCAL SEROTYPE 8 IGG: 5.32 UG/ML
PNEUMOCOCCAL SEROTYPE 9N IGG: 2.24 UG/ML
PNEUMOCOCCAL SEROTYPE 9V*, IGG: 3.32 UG/ML

## 2022-08-22 NOTE — PROGRESS NOTES
30 min discussion of BP control, high today. On max lisinopril, hate to inc metoprolol for fear of more fatigue. Recent cardio visit with . Also discussed immunodeficiency. Had extensive immuno lab testing via  results pending added Covid antibodies spike and nuclear both.  Inc soreness from cervical arthritis-refer PT. RTC 3mos   08/17/22  1420   BP: 156/80   Pulse:    Resp:    Temp:

## 2022-08-24 LAB
C1Q COMPLEMENT COMPONENT: 120 UG/ML
COMPLEMENT COMPONENT 2: 2.7 MG/DL

## 2022-09-09 ENCOUNTER — VIRTUAL PHONE E/M (OUTPATIENT)
Dept: INTERNAL MEDICINE CLINIC | Facility: CLINIC | Age: 67
End: 2022-09-09
Payer: MEDICARE

## 2022-09-09 DIAGNOSIS — K86.89 PANCREATIC MASS: Primary | ICD-10-CM

## 2022-09-09 PROCEDURE — 99442 PHONE E/M BY PHYS 11-20 MIN: CPT | Performed by: INTERNAL MEDICINE

## 2022-09-10 NOTE — PROGRESS NOTES
Virtual Telephone Check-In    [facility] verbally consents to a Virtual/Telephone Check-In visit on 09/09/22. Patient has been referred to the [facility] website at [email or web address] to review the yearly Consent to Treat document. Patient understands and accepts financial responsibility for any deductible, co-insurance and/or co-pays associated with this service. Duration of the service: 15 minutes audio only      Summary of topics discussed:     Had fu for kidney stones with CT abd with contrast 9/7/2022 showing incidental pancreatic mass(es). Interesting that prior CTs albeit they without contrast were read as NORMAL pancreas 6/2022 and 7/2022. Plan refer to their high risk Pancreatic clinic, get MRI, recommend [facility] multicancer screen which is fairly good for pancreatic cancer screening. Will fu later. Hopefully just benign cysts or adenomas.       Vishal Rueda MD

## 2022-10-17 ENCOUNTER — OFFICE VISIT (OUTPATIENT)
Dept: INTERNAL MEDICINE CLINIC | Facility: CLINIC | Age: 67
End: 2022-10-17
Payer: MEDICARE

## 2022-10-17 VITALS
OXYGEN SATURATION: 98 % | DIASTOLIC BLOOD PRESSURE: 76 MMHG | SYSTOLIC BLOOD PRESSURE: 156 MMHG | HEART RATE: 76 BPM | TEMPERATURE: 98 F | WEIGHT: 186 LBS | HEIGHT: 70 IN | BODY MASS INDEX: 26.63 KG/M2

## 2022-10-17 DIAGNOSIS — K86.2 PANCREAS CYST: ICD-10-CM

## 2022-10-17 DIAGNOSIS — H54.7 DECREASED VISION: ICD-10-CM

## 2022-10-17 DIAGNOSIS — N20.0 KIDNEY STONE: ICD-10-CM

## 2022-10-17 DIAGNOSIS — R91.1 LUNG NODULE: ICD-10-CM

## 2022-10-17 DIAGNOSIS — I10 ESSENTIAL HYPERTENSION: Primary | ICD-10-CM

## 2022-10-17 PROCEDURE — 99214 OFFICE O/P EST MOD 30 MIN: CPT | Performed by: INTERNAL MEDICINE

## 2022-10-17 RX ORDER — ROSUVASTATIN CALCIUM 5 MG/1
5 TABLET, COATED ORAL NIGHTLY
Qty: 90 TABLET | Refills: 3 | COMMUNITY
Start: 2022-10-17

## 2022-10-17 RX ORDER — POTASSIUM CITRATE 15 MEQ/1
TABLET, EXTENDED RELEASE ORAL
Qty: 120 TABLET | Refills: 0 | COMMUNITY
Start: 2022-10-17

## 2022-10-31 ENCOUNTER — TELEPHONE (OUTPATIENT)
Dept: INTERNAL MEDICINE CLINIC | Facility: CLINIC | Age: 67
End: 2022-10-31

## 2022-10-31 ENCOUNTER — OFFICE VISIT (OUTPATIENT)
Dept: INTERNAL MEDICINE CLINIC | Facility: CLINIC | Age: 67
End: 2022-10-31
Payer: MEDICARE

## 2022-10-31 ENCOUNTER — PATIENT MESSAGE (OUTPATIENT)
Dept: INTERNAL MEDICINE CLINIC | Facility: CLINIC | Age: 67
End: 2022-10-31

## 2022-10-31 VITALS
OXYGEN SATURATION: 98 % | BODY MASS INDEX: 26 KG/M2 | HEART RATE: 84 BPM | DIASTOLIC BLOOD PRESSURE: 88 MMHG | SYSTOLIC BLOOD PRESSURE: 170 MMHG | WEIGHT: 180 LBS | TEMPERATURE: 99 F | RESPIRATION RATE: 17 BRPM

## 2022-10-31 DIAGNOSIS — I10 ESSENTIAL HYPERTENSION: ICD-10-CM

## 2022-10-31 DIAGNOSIS — J06.9 UPPER RESPIRATORY TRACT INFECTION, UNSPECIFIED TYPE: Primary | ICD-10-CM

## 2022-10-31 PROCEDURE — 99213 OFFICE O/P EST LOW 20 MIN: CPT | Performed by: INTERNAL MEDICINE

## 2022-10-31 RX ORDER — COVID-19 MOLECULAR TEST ASSAY
KIT MISCELLANEOUS
COMMUNITY
Start: 2022-10-28 | End: 2022-10-31

## 2022-10-31 RX ORDER — AZITHROMYCIN 250 MG/1
TABLET, FILM COATED ORAL
Qty: 6 TABLET | Refills: 0 | Status: SHIPPED | OUTPATIENT
Start: 2022-10-31 | End: 2022-11-05

## 2022-10-31 RX ORDER — CLINDAMYCIN HYDROCHLORIDE 300 MG/1
CAPSULE ORAL
Refills: 0 | COMMUNITY
Start: 2022-10-31

## 2022-10-31 NOTE — TELEPHONE ENCOUNTER
Spoke with patient. Developed sore throat last week. Now experiencing ear pain, nasal congestion. Will perform one more home Covid test this am before 9:30am appointment.

## 2022-10-31 NOTE — TELEPHONE ENCOUNTER
Patient called and scheduled an appointment for 9:30 this morning. He states he has a sore throat and tested negative on both Thursday and Friday.

## 2022-10-31 NOTE — TELEPHONE ENCOUNTER
From: Silver Tolentino  To: Abi Hooks MD  Sent: 10/31/2022 11:47 AM CDT  Subject: BP and antibiotics    Dr Leann Oreilly asked me to call Layla Turner when I got home with my BP because it was very high in the office:    TODAY, after my visit, my BP has been averaging about 154/84. That is higher than average. During the month of October it averaged 140/81. While I have been sick and laying around, it has averaged about 150/84.

## 2022-10-31 NOTE — PROGRESS NOTES
On 10/26 sore throat, HA 10/28, mild fever and cough 10/29. Worsening throat and laryngitis today. Has flight to [facility] tomorrow. Neg Covid swab 10/27 and 1028 and today 1031. Had flushot. Prob got this from wife also sick (not my patient). No personal hx Covid and vaccinated thru new booster. 10/31/22  1004   BP: (!) 170/88   Pulse:    Resp:    Temp:      BP up. Severe pharyngitis, laryngitis, right serous otitis, rhinitis, clear lungs, normal heart tones.      URI  Told cancel [place]  Zpack (multiple allergies), Prudencio DM, Coricidin without pseudofed, Zinc lozenges  Will call in another BP from home and inc meto if needed  RTC prn  No tests or imaging

## 2022-11-28 RX ORDER — AZITHROMYCIN 250 MG/1
TABLET, FILM COATED ORAL
Qty: 6 TABLET | Refills: 0 | Status: SHIPPED | OUTPATIENT
Start: 2022-11-28 | End: 2022-12-03

## 2022-12-02 RX ORDER — AZITHROMYCIN 250 MG/1
TABLET, FILM COATED ORAL
Qty: 6 TABLET | Refills: 0 | Status: SHIPPED | OUTPATIENT
Start: 2022-12-02 | End: 2022-12-07

## 2022-12-02 RX ORDER — METOPROLOL SUCCINATE 25 MG/1
25 TABLET, EXTENDED RELEASE ORAL DAILY
Qty: 30 TABLET | Refills: 1 | Status: SHIPPED | OUTPATIENT
Start: 2022-12-02 | End: 2022-12-05

## 2022-12-05 RX ORDER — METOPROLOL SUCCINATE 25 MG/1
TABLET, EXTENDED RELEASE ORAL
Qty: 90 TABLET | Refills: 0 | Status: SHIPPED | OUTPATIENT
Start: 2022-12-05

## 2023-01-03 DIAGNOSIS — M25.511 CHRONIC RIGHT SHOULDER PAIN: Primary | ICD-10-CM

## 2023-01-03 DIAGNOSIS — G89.29 CHRONIC RIGHT SHOULDER PAIN: Primary | ICD-10-CM

## 2023-01-18 ENCOUNTER — TELEPHONE (OUTPATIENT)
Dept: INTERNAL MEDICINE CLINIC | Facility: CLINIC | Age: 68
End: 2023-01-18

## 2023-02-16 ENCOUNTER — NURSE ONLY (OUTPATIENT)
Dept: INTERNAL MEDICINE CLINIC | Facility: CLINIC | Age: 68
End: 2023-02-16
Payer: MEDICARE

## 2023-02-16 DIAGNOSIS — Z00.00 LABORATORY EXAMINATION ORDERED AS PART OF A ROUTINE GENERAL MEDICAL EXAMINATION: Primary | ICD-10-CM

## 2023-02-16 DIAGNOSIS — R73.03 PREDIABETES: ICD-10-CM

## 2023-02-16 LAB
AMB EXT CHLORIDE: 102
AMB EXT CHOL/HDL RATIO: 2.1
AMB EXT CHOLESTEROL, TOTAL: 145 MG/DL
AMB EXT CMP ALT: 24 U/L
AMB EXT CMP AST: 21 U/L
AMB EXT GLUCOSE: 104 MG/DL
AMB EXT HDL CHOLESTEROL: 68 MG/DL
AMB EXT HEMATOCRIT: 43.3
AMB EXT HEMOGLOBIN: 14.1
AMB EXT HGBA1C: 5.8 %
AMB EXT LDL CHOLESTEROL, DIRECT: 63 MG/DL
AMB EXT MCV: 81.9
AMB EXT NON HDL CHOL: 77 MG/DL
AMB EXT POSTASSIUM: 4.6 MMOL/L
AMB EXT PSA SCREEN: 0.34 NG/ML
AMB EXT SODIUM: 141 MMOL/L
AMB EXT TRIGLYCERIDES: 59 MG/DL
AMB EXT TSH: 1.35 MIU/ML
AMB EXT WBC: 6.1 X10(3)UL
BILIRUB UR QL STRIP.AUTO: NEGATIVE
CLARITY UR REFRACT.AUTO: CLEAR
CREAT UR-SCNC: 52.6 MG/DL
GLUCOSE UR STRIP.AUTO-MCNC: NEGATIVE MG/DL
KETONES UR STRIP.AUTO-MCNC: NEGATIVE MG/DL
LEUKOCYTE ESTERASE UR QL STRIP.AUTO: NEGATIVE
MICROALBUMIN UR-MCNC: 1.53 MG/DL
MICROALBUMIN/CREAT 24H UR-RTO: 29.1 UG/MG (ref ?–30)
NITRITE UR QL STRIP.AUTO: NEGATIVE
PH UR STRIP.AUTO: 6 [PH] (ref 5–8)
PROT UR STRIP.AUTO-MCNC: NEGATIVE MG/DL
SP GR UR STRIP.AUTO: 1.01 (ref 1–1.03)
UROBILINOGEN UR STRIP.AUTO-MCNC: <2 MG/DL

## 2023-02-16 PROCEDURE — 81001 URINALYSIS AUTO W/SCOPE: CPT | Performed by: INTERNAL MEDICINE

## 2023-02-16 PROCEDURE — 99173 VISUAL ACUITY SCREEN: CPT | Performed by: INTERNAL MEDICINE

## 2023-02-16 PROCEDURE — 82570 ASSAY OF URINE CREATININE: CPT | Performed by: INTERNAL MEDICINE

## 2023-02-16 PROCEDURE — 82043 UR ALBUMIN QUANTITATIVE: CPT | Performed by: INTERNAL MEDICINE

## 2023-02-16 PROCEDURE — 92551 PURE TONE HEARING TEST AIR: CPT | Performed by: INTERNAL MEDICINE

## 2023-02-16 PROCEDURE — 93923 UPR/LXTR ART STDY 3+ LVLS: CPT | Performed by: INTERNAL MEDICINE

## 2023-02-16 NOTE — PROGRESS NOTES
VENIPUNCTURE:x left arm 1 stick landed    ADD-ON TEST:    EKG:    SPIROMETRY:    URINE:x      VISION:      Right Eye 20/20      Left Eye 20/25     Both Eyes: 20/20      RANDY:      Right Tibial Foot _154__ divided by highest arm _140__ = ___1. 1       Right Dorsal Foot ___ divided by highest arm ___ = ___       Left Tibial Foot _166__ divided by highest arm _140__ = _1.1__       Left Dorsal Foot ___ divided by highest arm ___ = ___      ARM:   Right Brachial-132     Left Brachial-140      FOOT:   Right Tibial (Side)-154    Right Dorsal (Top)-      Left Tibial (Side)-166    Left Dorsal (Top)-      Greater than 1.3: results not reliable  1.01 to 1.3: correlated with history, especially if the patient has diabetes  0.97 to 1: normal  0.8 to 0.96: mild ischemia  0.4 to 0.79: moderate to severe ischemia  0.39 or less: severe ischemia; danger of limb loss

## 2023-02-27 PROBLEM — E78.5 HLD (HYPERLIPIDEMIA): Status: ACTIVE | Noted: 2022-06-21

## 2023-02-27 RX ORDER — POTASSIUM CITRATE 15 MEQ/1
TABLET, EXTENDED RELEASE ORAL 2 TIMES DAILY
COMMUNITY
Start: 2022-09-29 | End: 2023-03-01

## 2023-03-01 ENCOUNTER — OFFICE VISIT (OUTPATIENT)
Dept: INTERNAL MEDICINE CLINIC | Facility: CLINIC | Age: 68
End: 2023-03-01
Payer: MEDICARE

## 2023-03-01 VITALS
WEIGHT: 188.31 LBS | DIASTOLIC BLOOD PRESSURE: 80 MMHG | TEMPERATURE: 98 F | SYSTOLIC BLOOD PRESSURE: 168 MMHG | RESPIRATION RATE: 17 BRPM | OXYGEN SATURATION: 98 % | HEART RATE: 88 BPM | BODY MASS INDEX: 26.96 KG/M2 | HEIGHT: 70 IN

## 2023-03-01 DIAGNOSIS — D80.4 LOW SERUM IGA AND IGM LEVELS (HCC): ICD-10-CM

## 2023-03-01 DIAGNOSIS — D80.2 LOW SERUM IGA AND IGM LEVELS (HCC): ICD-10-CM

## 2023-03-01 DIAGNOSIS — Z00.00 ANNUAL PHYSICAL EXAM: Primary | ICD-10-CM

## 2023-03-01 DIAGNOSIS — I48.0 PAF (PAROXYSMAL ATRIAL FIBRILLATION) (HCC): ICD-10-CM

## 2023-03-01 LAB
ATRIAL RATE: 70 BPM
P AXIS: 26 DEGREES
P-R INTERVAL: 140 MS
Q-T INTERVAL: 400 MS
QRS DURATION: 96 MS
QTC CALCULATION (BEZET): 432 MS
R AXIS: 1 DEGREES
T AXIS: 61 DEGREES
VENTRICULAR RATE: 70 BPM

## 2023-03-01 RX ORDER — AMLODIPINE BESYLATE 2.5 MG/1
2.5 TABLET ORAL DAILY
Qty: 90 TABLET | Refills: 3 | Status: SHIPPED | OUTPATIENT
Start: 2023-03-01

## 2023-03-01 RX ORDER — AMLODIPINE BESYLATE 2.5 MG/1
2.5 TABLET ORAL DAILY
COMMUNITY
End: 2023-03-01

## 2023-05-07 RX ORDER — AMLODIPINE BESYLATE 2.5 MG/1
5 TABLET ORAL DAILY
Qty: 90 TABLET | Refills: 3 | COMMUNITY
Start: 2023-05-07

## 2023-05-10 ENCOUNTER — MED REC SCAN ONLY (OUTPATIENT)
Dept: INTERNAL MEDICINE CLINIC | Facility: CLINIC | Age: 68
End: 2023-05-10

## 2023-05-15 ENCOUNTER — PATIENT MESSAGE (OUTPATIENT)
Dept: INTERNAL MEDICINE CLINIC | Facility: CLINIC | Age: 68
End: 2023-05-15

## 2023-05-15 NOTE — TELEPHONE ENCOUNTER
From: Arsenio Dies  To: Toni Knight MD  Sent: 5/15/2023 7:50 AM CDT  Subject: Update and request     1. Based on visit with [facility] office, I increased Amlodipine to 5 mg. BP is now averaging 135/80. Often in 120s with exercise. Good. 2. I am going on a pilgrimage to [place] in a few weeks for two weeks with wife and son. I want to be prepared if I get Covid. I will bring self tests. My Manriquez Dorian recommends that I take paxlovid and metformin with me to be prepared if I contract it. Can you please prescribe both for me to take with? Also, he recommends that I cut eliquis in half if I need to take paxlovid. I want your thoughts on that. [facility] said to stay the course and just monitor BP. Separately, he said not only does metformin help with sugar and reduce odds of long Covid, it also increases longevity and reduces brain disease!

## 2023-05-19 ENCOUNTER — TELEPHONE (OUTPATIENT)
Dept: INTERNAL MEDICINE CLINIC | Facility: CLINIC | Age: 68
End: 2023-05-19

## 2023-08-17 NOTE — PATIENT INSTRUCTIONS
Refill policies:    • Allow 2-3 business days for refills; controlled substances may take longer.   • Contact your pharmacy at least 5 days prior to running out of medication and have them send an electronic request or submit request through the “request re What Is The Reason For Today's Visit?: Full Body Skin Examination Depending on your insurance carrier, approval may take 3-10 days. It is highly recommended patients contact their insurance carrier directly to determine coverage.   If test is done without insurance authorization, patient may be responsible for the entire What Is The Reason For Today's Visit? (Being Monitored For X): concerning skin lesions on an annual basis

## 2023-09-07 ENCOUNTER — LAB ENCOUNTER (OUTPATIENT)
Dept: LAB | Age: 68
End: 2023-09-07
Attending: INTERNAL MEDICINE
Payer: MEDICARE

## 2023-09-07 DIAGNOSIS — D80.2 SELECTIVE IGA IMMUNODEFICIENCY (HCC): Primary | ICD-10-CM

## 2023-09-07 LAB
IGA SERPL-MCNC: <7.83 MG/DL (ref 70–312)
IGM SERPL-MCNC: 22.8 MG/DL (ref 43–279)
IMMUNOGLOBULIN PNL SER-MCNC: 1280 MG/DL (ref 791–1643)

## 2023-09-07 PROCEDURE — 82785 ASSAY OF IGE: CPT

## 2023-09-07 PROCEDURE — 36415 COLL VENOUS BLD VENIPUNCTURE: CPT

## 2023-09-07 PROCEDURE — 82784 ASSAY IGA/IGD/IGG/IGM EACH: CPT

## 2023-09-08 LAB — IGE SERPL-ACNC: <2 KU/L (ref 2–214)

## 2023-09-15 ENCOUNTER — MED REC SCAN ONLY (OUTPATIENT)
Dept: INTERNAL MEDICINE CLINIC | Facility: CLINIC | Age: 68
End: 2023-09-15

## 2023-09-15 ENCOUNTER — TELEPHONE (OUTPATIENT)
Dept: INTERNAL MEDICINE CLINIC | Facility: CLINIC | Age: 68
End: 2023-09-15

## 2023-10-06 ENCOUNTER — VIRTUAL PHONE E/M (OUTPATIENT)
Dept: INTERNAL MEDICINE CLINIC | Facility: CLINIC | Age: 68
End: 2023-10-06
Payer: MEDICARE

## 2023-10-06 DIAGNOSIS — K86.2 PANCREATIC CYST: Primary | ICD-10-CM

## 2023-10-06 NOTE — PROGRESS NOTES
Virtual Telephone Check-In    Escobar Adam verbally consents to a Virtual/Telephone Check-In visit on 10/06/23. Patient has been referred to the [facility] website at [email or web address] to review the yearly Consent to Treat document. Patient understands and accepts financial responsibility for any deductible, co-insurance and/or co-pays associated with this service. Duration of the service: 15 minutes audio only      Summary of topics discussed:     Discussed enlarging pancreatic cyst on MRIs. Support GI recommendation for ERCP and cyst fluid under endoscopic US. Given option of waiting and repeating MRI in 3-6 mos but he and I agreed on this procedure to be more certain. Jenelle Mendoza is reassuring.          Kiran Jordan MD

## 2023-11-28 ENCOUNTER — PATIENT MESSAGE (OUTPATIENT)
Dept: INTERNAL MEDICINE CLINIC | Facility: CLINIC | Age: 68
End: 2023-11-28

## 2023-11-28 NOTE — TELEPHONE ENCOUNTER
From: Georgia Quiros  To: Rob Prakash  Sent: 11/28/2023 9:38 AM CST  Subject: update and Metformin    I have 2 items. First, good news on the biopsy of my pancreatic cyst! No cancer cells. Further testing showed NOT a high risk cyst, and low probability of becoming cancerous in near future (see attachment below) . Prayers answered! Second, years ago you suggested that I go on Metformin because of my boardline A1C. My best friend who is [facility] internist strongly advised me to go on it too. He said that it has the added benefit of potentially reducing certain cancers and altzeimhers, and increasing longivity. I AM READY TO TRY IT. My internist friend suggested starting on a HALF DOSE and working my up to address any GI intolerance that I may have. My pharmacist Niece suggested EXTENDED RELEASE That all sounds good to me ASSUMING THAT YOU AGREE. I also assume that the metformin can only help my pancreatic cysts versus aggravate them. If you want to discuss live, I am available for a call at your convenience. Otherwise, if you can please prescribe the extended release, half dose, I will give it a try. thanks! I hope you had a great Thanksgiving!

## 2023-12-18 ENCOUNTER — PATIENT MESSAGE (OUTPATIENT)
Dept: INTERNAL MEDICINE CLINIC | Facility: CLINIC | Age: 68
End: 2023-12-18

## 2023-12-18 RX ORDER — LANCETS
EACH MISCELLANEOUS
Qty: 102 EACH | Refills: 3 | Status: SHIPPED | OUTPATIENT
Start: 2023-12-18

## 2024-02-26 ENCOUNTER — NURSE ONLY (OUTPATIENT)
Dept: INTERNAL MEDICINE CLINIC | Facility: CLINIC | Age: 69
End: 2024-02-26
Payer: MEDICARE

## 2024-02-26 DIAGNOSIS — R73.03 PREDIABETES: ICD-10-CM

## 2024-02-26 DIAGNOSIS — Z00.00 LABORATORY EXAMINATION ORDERED AS PART OF A ROUTINE GENERAL MEDICAL EXAMINATION: Primary | ICD-10-CM

## 2024-02-26 LAB
AMB EXT BILIRUBIN, TOTAL: 0.7 MG/DL
AMB EXT BUN: 18 MG/DL
AMB EXT CALCIUM: 10.1
AMB EXT CARBON DIOXIDE: 26
AMB EXT CHLORIDE: 105
AMB EXT CHOL/HDL RATIO: 2.2
AMB EXT CHOLESTEROL, TOTAL: 146 MG/DL
AMB EXT CMP ALT: 30 U/L (ref 9–46)
AMB EXT CMP AST: 24 U/L (ref 10–35)
AMB EXT CREATININE: 0.88 MG/DL
AMB EXT GLUCOSE: 113 MG/DL
AMB EXT HDL CHOLESTEROL: 67 MG/DL
AMB EXT HEMATOCRIT: 45.7 (ref 38.5–50)
AMB EXT HEMOGLOBIN: 14.3 (ref 13.2–17.1)
AMB EXT HGBA1C: 5.8 %
AMB EXT LDL CHOLESTEROL, DIRECT: 65 MG/DL
AMB EXT MCV: 84.6 (ref 80–100)
AMB EXT NON HDL CHOL: 79 MG/DL
AMB EXT PLATELETS: 238 (ref 140–400)
AMB EXT POSTASSIUM: 5.3 MMOL/L (ref 3.5–5.1)
AMB EXT PSA SCREEN: 0.33 NG/ML
AMB EXT SODIUM: 143 MMOL/L (ref 136–145)
AMB EXT TOTAL PROTEIN: 7.4 (ref 6.1–8)
AMB EXT TRIGLYCERIDES: 59 MG/DL
AMB EXT TSH: 1.72 MIU/ML
AMB EXT WBC: 7.2 X10(3)UL
BILIRUB UR QL STRIP.AUTO: NEGATIVE
CLARITY UR REFRACT.AUTO: CLEAR
COLOR UR AUTO: COLORLESS
CREAT UR-SCNC: 18.2 MG/DL
GLUCOSE UR STRIP.AUTO-MCNC: NORMAL MG/DL
KETONES UR STRIP.AUTO-MCNC: NEGATIVE MG/DL
LEUKOCYTE ESTERASE UR QL STRIP.AUTO: NEGATIVE
MICROALBUMIN UR-MCNC: 0.59 MG/DL
MICROALBUMIN/CREAT 24H UR-RTO: 32.4 UG/MG (ref ?–30)
NITRITE UR QL STRIP.AUTO: NEGATIVE
PH UR STRIP.AUTO: 6 [PH] (ref 5–8)
PROT UR STRIP.AUTO-MCNC: NEGATIVE MG/DL
RBC UR QL AUTO: NEGATIVE
SP GR UR STRIP.AUTO: 1 (ref 1–1.03)
UROBILINOGEN UR STRIP.AUTO-MCNC: NORMAL MG/DL

## 2024-02-26 PROCEDURE — 99173 VISUAL ACUITY SCREEN: CPT | Performed by: INTERNAL MEDICINE

## 2024-02-26 PROCEDURE — 92551 PURE TONE HEARING TEST AIR: CPT | Performed by: INTERNAL MEDICINE

## 2024-02-26 PROCEDURE — 82570 ASSAY OF URINE CREATININE: CPT | Performed by: INTERNAL MEDICINE

## 2024-02-26 PROCEDURE — 93923 UPR/LXTR ART STDY 3+ LVLS: CPT | Performed by: INTERNAL MEDICINE

## 2024-02-26 PROCEDURE — 82043 UR ALBUMIN QUANTITATIVE: CPT | Performed by: INTERNAL MEDICINE

## 2024-02-26 PROCEDURE — 81003 URINALYSIS AUTO W/O SCOPE: CPT | Performed by: INTERNAL MEDICINE

## 2024-02-26 NOTE — PROGRESS NOTES
VENIPUNCTURE: left arm 1 stick landed    ADD-ON TEST:    EKG:    SPIROMETRY:    URINE: Yes      VISION: [facility] last eye exam 10/24/2023     Right Eye 20/20      Left Eye 20/20     Both Eyes: 20/20      RANDY:      Right Tibial Foot __170_ divided by highest arm _166__ = __1.0_       Right Dorsal Foot ___ divided by highest arm ___ = ___       Left Tibial Foot _174__ divided by highest arm __166_ = __1.0_       Left Dorsal Foot ___ divided by highest arm ___ = ___      ARM:   Right Brachial-162     Left Brachial-166      FOOT:   Right Tibial (Side)-170    Right Dorsal (Top)-      Left Tibial (Side)-174    Left Dorsal (Top)-      Greater than 1.3: results not reliable  1.01 to 1.3: correlated with history, especially if the patient has diabetes  0.97 to 1: normal  0.8 to 0.96: mild ischemia  0.4 to 0.79: moderate to severe ischemia  0.39 or less: severe ischemia; danger of limb loss

## 2024-03-20 NOTE — TELEPHONE ENCOUNTER
Metformin 500 mg 1/2 tab bid filled 11/28/23 45 with 3 refills     Phone visit 10/6/23  Return if symptoms worsen or fail to improve.  Next apt 3/25/24  Labs   2/16/23    HgbA1C  % 5.8

## 2024-03-22 ENCOUNTER — TELEPHONE (OUTPATIENT)
Dept: INTERNAL MEDICINE CLINIC | Facility: CLINIC | Age: 69
End: 2024-03-22

## 2024-03-22 NOTE — PROGRESS NOTES
1- Bone Dexa: Start at age 70     2- EGD/ Colonoscopy: 4/2/2021 by Dr. Barnard    3- Dental: Periodic     4- Eye Exam: 10/24/2023 [facility]     5- Full Skin Exam: Sees Dr. Simmons     6- Heart Test:      7-  Strength:      8- Gait Speed:      9- Ankle Brachial Index:    ______________________________________________________________________     HSCRP: 1.5  Myeloperoxidase: Normal 199     LIPIDS:      TOTAL CHOL: Normal 146  HDL CHOL: Normal 67  TRIGLYCERIDES: Normal 59  LDL CHOL-C: Normal 65  CHOL/HDL-C: Normal 2.2  NON-HDL CHOL: Normal 79  APO B: Normal 53  LipoProtein (a):       GLUCOSE: 113  HbA1C: 5.8  TMAO: 6.6     VITAMIN B12: Normal 640     VITAMIN D: Normal 70.8     OMEGA CHECK: 4.5     CMP:      TSH: Normal 1.72     PSA: Normal 0.330     CBC:      UA:     Audiometry done on  :    Hearing aids Y/N = N      500 Hz 1000Hz 2000Hz 4000Hz   Left Ear: 25 dB   Y   Y   Y   N     Left Ear:   40 dB Y Y Y Y   Right Ear: 25 dB   Y   Y   Y   Y     Right Ear:   40 dB Y Y Y Y

## 2024-03-22 NOTE — TELEPHONE ENCOUNTER
Faxed [facility] final lab results to [facility]: Beatriz Yeager MD Fax# [phone]

## 2024-03-25 ENCOUNTER — OFFICE VISIT (OUTPATIENT)
Dept: INTERNAL MEDICINE CLINIC | Facility: CLINIC | Age: 69
End: 2024-03-25
Payer: MEDICARE

## 2024-03-25 VITALS
BODY MASS INDEX: 27.44 KG/M2 | DIASTOLIC BLOOD PRESSURE: 84 MMHG | TEMPERATURE: 98 F | SYSTOLIC BLOOD PRESSURE: 122 MMHG | WEIGHT: 193.81 LBS | HEART RATE: 70 BPM | HEIGHT: 70.5 IN | OXYGEN SATURATION: 98 %

## 2024-03-25 DIAGNOSIS — I10 ESSENTIAL HYPERTENSION: ICD-10-CM

## 2024-03-25 DIAGNOSIS — K86.89 PANCREATIC MASS (HCC): ICD-10-CM

## 2024-03-25 DIAGNOSIS — R73.03 PREDIABETES: ICD-10-CM

## 2024-03-25 DIAGNOSIS — D80.2 LOW SERUM IGA AND IGM LEVELS (HCC): ICD-10-CM

## 2024-03-25 DIAGNOSIS — E87.5 HYPERKALEMIA: ICD-10-CM

## 2024-03-25 DIAGNOSIS — Z00.00 ANNUAL PHYSICAL EXAM: Primary | ICD-10-CM

## 2024-03-25 DIAGNOSIS — R05.3 CHRONIC COUGH: ICD-10-CM

## 2024-03-25 DIAGNOSIS — I48.0 PAF (PAROXYSMAL ATRIAL FIBRILLATION) (HCC): ICD-10-CM

## 2024-03-25 DIAGNOSIS — D80.4 LOW SERUM IGA AND IGM LEVELS (HCC): ICD-10-CM

## 2024-03-25 LAB
ATRIAL RATE: 64 BPM
P AXIS: 3 DEGREES
P-R INTERVAL: 148 MS
Q-T INTERVAL: 428 MS
QRS DURATION: 98 MS
QTC CALCULATION (BEZET): 441 MS
R AXIS: 1 DEGREES
T AXIS: 42 DEGREES
VENTRICULAR RATE: 64 BPM

## 2024-03-25 RX ORDER — CHOLECALCIFEROL (VITAMIN D3) 25 MCG
1 CAPSULE ORAL DAILY
COMMUNITY
Start: 2023-07-19

## 2024-03-25 RX ORDER — LUTEIN 20 MG
CAPSULE ORAL
COMMUNITY
Start: 2023-07-19

## 2024-03-25 RX ORDER — CIPROFLOXACIN 250 MG/1
250 TABLET, FILM COATED ORAL 2 TIMES DAILY
COMMUNITY
Start: 2023-11-03 | End: 2024-03-25 | Stop reason: ALTCHOICE

## 2024-03-26 ENCOUNTER — PATIENT MESSAGE (OUTPATIENT)
Dept: INTERNAL MEDICINE CLINIC | Facility: CLINIC | Age: 69
End: 2024-03-26

## 2024-03-26 NOTE — PROGRESS NOTES
Subjective:   Patient ID: Law Gerber is a 68 year old male.CPE    Multiple stable medical problems        History/Other:   Review of Systems   Constitutional:  Positive for fatigue (from metoprolol).   HENT:          Tonsillectomy, old  facial scar   Eyes:  Positive for visual disturbance (floaters, retinal tears).   Respiratory:  Positive for apnea (on CPAP).    Cardiovascular:         Lipids, Mitral valve repair, hx rheumatic fever, afib-ablation   Gastrointestinal:  Positive for diarrhea (occasional).        Colon polyps, hemorrhoids, liver cysts, GB out, umbilical and inguinal hernias, reflux, diverticulosis   Endocrine:        Prediabetes, thyroid ok   Genitourinary:  Positive for difficulty urinating.        BPH with elevated PSA neg bx, kidney cysts, kidney stone, hematuria, contusion left kidney, hx retrograde ejaculation or no ejaculation   Musculoskeletal:  Positive for arthralgias (occasional) and back pain (occasional).        Occasional lumbar disc, knee problems   Skin: Negative.    Allergic/Immunologic:        See list   Neurological:  Positive for dizziness (hx BPPV 3/2014).   Hematological:  Bruises/bleeds easily.        Thal trait, low immunoglobulins   Psychiatric/Behavioral: Negative.         Current Outpatient Medications   Medication Sig Dispense Refill    Cholecalciferol (VITAMIN D-3) 25 MCG (1000 UT) Oral Cap Take 1 capsule by mouth daily.      Lutein-Zeaxanthin 20-1 MG Oral Cap lutein 20 mg-zeaxanthin 1,000 mcg capsule, [RxNorm: 0558612]      Cholecalciferol 50 MCG (2000 UT) Oral Tab Take 1 tablet (2,000 Units total) by mouth daily.      metFORMIN 500 MG Oral Tab Take 0.5 tablets (250 mg total) by mouth 2 (two) times daily with meals. 30 tablet 3    Accu-Chek FastClix Lancets Does not apply Misc USE ONCE DAILY AS DIRECTED 102 each 3    amLODIPine 2.5 MG Oral Tab Take 2 tablets (5 mg total) by mouth daily. 90 tablet 3    Clindamycin Phosphate 1 % External Gel Apply 1 Application  topically 2 (two) times daily. Apply to scalp bid 30 g 3    rosuvastatin 5 MG Oral Tab Take 1 tablet (5 mg total) by mouth nightly. 90 tablet 3    Glucose Blood (ONETOUCH ULTRA) In Vitro Strip TEST BLOOD SUGAR ONCE DAILY 100 each 3    Metoprolol Succinate ER 50 MG Oral Tablet 24 Hr Take 1 tablet (50 mg total) by mouth daily.      ELIQUIS 5 MG Oral Tab TAKE 1 TABLET BY MOUTH TWICE DAILY 180 tablet 1    lisinopril 40 MG Oral Tab Take 0.5 tablets (20 mg total) by mouth daily.      Multiple Vitamins-Minerals (COMPLETE DAILY/LUTEIN) Oral Tab Take by mouth.       Allergies:  Allergies   Allergen Reactions    Flomax [Tamsulosin] DIZZINESS    Aleve RASH    Levaquin [Levofloxacin Hemihydrate] DIARRHEA    Penicillins RASH and OTHER (SEE COMMENTS)    Vancomycin UNKNOWN       Objective:   Physical Exam  Constitutional:       General: He is not in acute distress.  Eyes:      Conjunctiva/sclera: Conjunctivae normal.      Pupils: Pupils are equal, round, and reactive to light.   Cardiovascular:      Rate and Rhythm: Normal rate and regular rhythm.      Heart sounds: Murmur (faint sys murmurhx MVR-ok) heard.      Comments: Sternal scar  Pulmonary:      Breath sounds: Normal breath sounds.   Abdominal:      General: Bowel sounds are normal.      Palpations: Abdomen is soft.      Tenderness: There is no abdominal tenderness.   Genitourinary:     Rectum: Guaiac result negative.      Comments: BPH, left inguinal hernia+  Musculoskeletal:         General: No tenderness. Normal range of motion.      Cervical back: Normal range of motion and neck supple.      Comments: Feet ok not diabetic   Skin:     Comments: normal   Neurological:      General: No focal deficit present.      Mental Status: He is alert. Mental status is at baseline.   Psychiatric:         Mood and Affect: Mood normal.         Thought Content: Thought content normal.       Active Problems:  Patient Active Problem List   Diagnosis    CHIDI (obstructive sleep apnea)    Alpha  thalassemia 2    Kidney cysts    Liver cyst    Essential hypertension    Diverticulosis    BPH with elevated PSA    Hemorrhoids, internal    Dyslipidemia    Lumbar herniated disc    Kidney stone on right side    Mitral valve disease    BPPV (benign paroxysmal positional vertigo)    Bilateral inguinal hernia    Prediabetes    Family history of colon cancer    History of pneumonia    History of colon polyps    PAF (paroxysmal atrial fibrillation) (HCC)    Gastro-esophageal reflux disease with esophagitis    Gastritis    Colon polyps    Third degree hemorrhoids    Hematuria    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)    Low serum IgE    HLD (hyperlipidemia)       Past Medical History:  Past Medical History:   Diagnosis Date    Abdominal hernia several years    Alpha thalassemia 2     Bilateral knee swelling 1992    viral-old    BPH with elevated PSA     Laser surgery 6-    BPPV (benign paroxysmal positional vertigo) 03/2014    Calculus of kidney 06/24/2022    right stone removed    Chickenpox child    measles, mumps    Colon polyps 04/02/2021    Diarrhea, unspecified Many years    Only recently with rich or spiced foods    Diverticulosis 01/2012    colonoscopy-[facility]    Dyslipidemia     Essential hypertension     Facial laceration 1970    60 stitches to face    Family history of colon cancer     father    Gallstones 03/2011    [facility]    Gross hematuria 06/2022    complicating laser OR     Hemorrhoids Many years    Hemorrhoids, internal 01/2012    colonoscopy-[facility]    History of cardiac murmur mitrovalve prolapse as child    valve repaired in 2004    History of colon polyps 2001    History of pneumonia chid    History of rheumatic fever possibly as child; possibly scarlet fever    Inguinal hernia     bilateral    Kidney cysts 12/2010    U/S    Kidney stone on right side 12/2010    U/S    Liver cyst 12/2010    U/S    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) 04/28/2022    Low serum IgE 04/28/2022    Lumbar herniated disc     L4-5  no surgery    Major contusion of left kidney 1971    urinating blood    Measles     Mitral valve disease 02/2004    [place] repair    Mononucleosis college    Mumps     Night sweats Once post surgery    If t recurred, ot has been very mild    CHIDI (obstructive sleep apnea)     CPAP 7 [facility]    PAF (paroxysmal atrial fibrillation) (HCC) AFIB diagnosed 12/2019    Ablation done 6/1/2020    Prediabetes     Retinal tear of left eye     Shingles 1993    chest    Umbilical hernia 03/2011       Past Surgical History:  Past Surgical History:   Procedure Laterality Date    ABLATION  01/2020    [place]    BIOPSY OF PROSTATE,NEEDLE/PUNCH  11/04/2011     negative    BONE MARROW  1992    normal    CARDIAC VALVE SURGERY  02/2004    mitral valve repair=[facility]    CHOLECYSTECTOMY  03/2011    COLONOSCOPY  01/2012        COLONOSCOPY  03/22/2017    diverticulosis, internal hemorrhoids    COLONOSCOPY  04/02/2021    [facility]    CONTACT LASER SURGERY OF PROSTATE  06/24/2022    [facility]-complicated by hematuria    CORONARY ARTERY ANGIO S&I  1999    normal-[facility]    CYSTOSCOPY,+URETEROSCOPY  01/25/2013    Cystoscopy     CYSTOSCOPY,MANIPULATN  07/06/2022    and removal stent    CYSTOURETHROSCOPY  06/24/2022    with lithotripsy and stent    EGD  07/02/2021    [facility]    EGD  07/2021    LASER SURGERY OF EYE  2009    left    TONSILLECTOMY  1986    UMBILICAL HERNIA REPAIR  03/2011       Family History:  Family History   Problem Relation Age of Onset    Colon Cancer Father         69    Other (Other) Father     Breast Cancer Maternal Grandmother         85?    Colon Cancer Maternal Grandmother         85?    Cancer Maternal Grandfather         Either colon or prostate cancer    Arthritis Mother     High Blood Pressure Mother     Heart Disorder Mother         a Fib    Hypertension Mother         70?    Other (Other) Mother     Other (psoriasis) Mother     Other (Kidney Cancer) Mother     Heart Disease Sister          TIAs/stent    High Blood Pressure Sister     Other (Lymphoma) Sister     Heart Disorder Sister         mvp    Heart Disorder Son         bi cuspid valve    Depression Son     Colon Cancer Paternal Grandfather         72    Other (migraine headaches) Sister         x3    Diabetes Paternal Grandmother     Diabetes Maternal Uncle        Social History:  Social History     Socioeconomic History    Marital status:      Spouse name: Leanne    Number of children: 4    Years of education: Not on file    Highest education level: Not on file   Occupational History    Occupation: CPA     Comment: retired   Tobacco Use    Smoking status: Never    Smokeless tobacco: Never    Tobacco comments:     none   Vaping Use    Vaping Use: Never used   Substance and Sexual Activity    Alcohol use: Never    Drug use: Never    Sexual activity: Not on file   Other Topics Concern     Service Not Asked    Blood Transfusions Not Asked    Caffeine Concern No    Occupational Exposure Not Asked    Hobby Hazards Not Asked    Sleep Concern Not Asked    Stress Concern No    Weight Concern No    Special Diet No    Back Care Not Asked    Exercise Yes    Bike Helmet Not Asked    Seat Belt Yes    Self-Exams Not Asked   Social History Narrative    Not on file     Social Determinants of Health     Financial Resource Strain: Not on file   Food Insecurity: Not on file   Transportation Needs: Not on file   Physical Activity: Not on file   Stress: Not on file   Social Connections: Not on file   Housing Stability: Not on file       Health Maintenance:    Immunization History   Administered Date(s) Administered    Covid-19 Vaccine Moderna 100 mcg/0.5 ml 02/12/2021, 03/15/2021    Covid-19 Vaccine Pfizer 30 mcg/0.3 ml 10/25/2021, 04/13/2022    Covid-19 Vaccine Pfizer Bivalent 30mcg/0.3mL 09/30/2022    Covid-19 Vaccine Pfizer Sly-Sucrose 30 mcg/0.3 ml 04/13/2022    FLU VAC High Dose 65 YRS & Older PRSV Free (77086) 09/14/2020, 10/13/2021, 09/28/2022,  09/29/2022, 09/19/2023    FLUZONE 6 months and older PFS 0.5 ml (77769) 09/22/2016    Flucelvax 0.5 Ml Quad PFS Single Dose 10/14/2019    HIGH DOSE FLU 65 YRS AND OLDER PRSV FREE SINGLE D (49068) FLU CLINIC 10/13/2021, 09/19/2023    Influenza 10/05/2011, 11/07/2012, 11/18/2013, 11/03/2014, 10/19/2015, 09/21/2016, 10/14/2017, 09/25/2018, 10/14/2019    Pfizer Covid-19 Vaccine 30mcg/0.3ml 12yrs+ (8147-7794) 10/12/2023    Pneumococcal (Prevnar 13) 01/21/2015    Pneumovax 23 03/27/2018    RSV, recombinant, RSVpreF, adjuvanted (Arexvy) 12/01/2023    TD 06/26/2002    TDAP 11/26/2013    Zoster Vaccine Live (Zostavax) 07/14/2015    Zoster Vaccine Recombinant Adjuvanted (Shingrix) 02/11/2022, 07/19/2022   Vitals:  Vitals:    03/25/24 1016   BP: 122/84   BP Location: Left arm   Patient Position: Sitting   Cuff Size: adult   Pulse: 70   Temp: 98.2 °F (36.8 °C)   TempSrc: Temporal   SpO2: 98%   Weight: 193 lb 12.8 oz (87.9 kg)   Height: 5' 10.5\" (1.791 m)     Body mass index is 27.41 kg/m².      Health Screens    Anxiety Index: No major anxiety   Depression Index: No major depression   Cage Questionnaire: No indication of alcohol abuse  Sleepiness Index: Normal  Male Sexual Health Inventory: N/A  Metabolic syndrome (hypertension, lipid abnormality, obesity, pre-diabetic): Present  Mini-Mental Examination: Normal in conversation  Nutritional Screen: Reviewed and discussed   SAFE Questions (Domestic Violence): Negative  Exercise: Active  Advance Directives:  (Scanned document on file)  Lookin Body: Wt 194  BMI  27  PBF 18 could lose 10lbs      1- Bone Dexa: Start at age 70     2- EGD/ Colonoscopy: 4/2/2021 by Dr. Evon galeano 4/2024  EGD and US-guided bx of panc cysts 11/2023 [facility]     3- Dental: Periodic     4- Eye Exam: 10/24/2023 [facility]     5- Full Skin Exam: Sees Dr. Simmons     6- Heart Test:   Abnormal EKG incomplete RBBB unchanged  Echo and stress scheduled     7- , Gait, leg pressures ok    8- Other:  US  carotids scheduled  CT kidneys and abd July and Sept 2022 [facility]- cysts, BPH, right kidney stone   MRI pancreas 10/2023 redo 6mos = 4/2024 will also image rest of abd-prefers [facility]        ______________________________________________________________________     Inflammation:  HSCRP: 1.5 little high just watch  Myeloperoxidase: Normal 199       LIPIDS:      TOTAL CHOL: Normal 146  HDL CHOL: Normal 67  TRIGLYCERIDES: Normal 59  LDL CHOL-C: Normal 65  CHOL/HDL-C: Normal 2.2  NON-HDL CHOL: Normal 79  APO B: Normal 53        GLUCOSE: 113  HbA1C: 5.8  Prediabetic-continue Metformin    TMAO: 6.6 avoiding red meat-eat less dairy and fewer eggs     VITAMIN B12: Normal 640     VITAMIN D: Normal 70.8     OMEGA CHECK: 4.5 eat more fish and take Omegas     CMP: normal except  and Pot high 5.3     TSH: Normal 1.72     PSA: Low Normal 0.330     CBC: Normal (hx thalassemia trait)     UA: Normal and trace protein in urine on MAB  32 want <30       Audiometry done on  :     Hearing aids Y/N = N         500 Hz 1000Hz 2000Hz 4000Hz   Left Ear: 25 dB    Y    Y    Y    N      Left Ear:    40 dB Y Y Y Y   Right Ear: 25 dB    Y    Y    Y    Y      Right Ear:    40 dB Y Y Y Y          Assessment & Plan:   1. Annual physical exam    2. Essential hypertension    3. Prediabetes    4. PAF (paroxysmal atrial fibrillation) (HCC)    5. Chronic cough    6. 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 Pancreatic mass (HCC)    8. Hyperkalemia        Orders Placed This Encounter   Procedures    Basic Metabolic Panel (8) [E]    Hemoglobin A1C [E]    Sputum culture [E]       Meds This Visit:  Requested Prescriptions      No prescriptions requested or ordered in this encounter       Imaging & Referrals:  ELECTROCARDIOGRAM, COMPLETE        SUMMARY:     Regarding cardiovascular health, history of mitral valve repair stable, hypertension controlled, lipids under control,and prediabetes improved. Fortunately, you do not smoke. Awating US  carotids, echo, stress. Hx MVR-Vulcan. Normal coronary angiogram way back in 1999 by [facility]asha. Ablation 1/2020 follow up cardios Giovani and Toy. Need to increase Omegas, lose 10lbs and avoid dairy and eggs(high TMAO and CRP)-normal MPO.       Regarding cancer, enlarged prostate with elevated PSA in past now normal after prostate procedures. Kidney cysts and a small right kidney stone on recent imaging. Polyps but no sign of colon cancer on colonoscopy 4/2021-family hx colon CA. EGD 7/2021.Repeat both now at 3yrs. No sign of skin cancer on exam. Galleri cancer screen neg 9/2023 redo 9/2024. MRI abd with pancreatic bx Oct/Nov 2023 redo now at 6mos 5/2024.      Obstructive sleep apnea, stable on CPAP-consider update on equipment.        Musculoskeletal concerns with occasional lumbar disk and bilateral knee issues.     Gastrointestinal issues, gallstones are out. Hemorrhoids polyps, diverticulosis, liver cyst, all stable. Occ diarrhea. Can wait on inguinal hernia repair. Hyperkalemia need to reduce lisinopril-follow BP but should be ok.         CONCLUSIONS: Herb, your medical problems are stable need better diet and wt loss. Reduce lisinopril 40 to 20 due to elevated potassium. Repeat labs in 3mos-Potassium, FBS, A1C, omegas. Await repeat MRI abd at [facility], Carotid and Cardiac testing,EGD/Colonoscopy soon.

## 2024-03-26 NOTE — TELEPHONE ENCOUNTER
From: Law Gerber  To: Mc Cardoza  Sent: 3/26/2024 12:48 PM CDT  Subject: Sputum culture?    I was reviewing the after visit notes on my chart, and it said that I should have a sputum culture done now. I don't recall discussing that, or do I know what it is for. I assume it is just a mistake?

## 2024-04-03 ENCOUNTER — PATIENT MESSAGE (OUTPATIENT)
Dept: INTERNAL MEDICINE CLINIC | Facility: CLINIC | Age: 69
End: 2024-04-03

## 2024-04-04 NOTE — TELEPHONE ENCOUNTER
From: Law Gerber  To: Mc Cardoza  Sent: 4/3/2024 10:45 AM CDT  Subject: Text received     I received this text. My guess is that it’s an automated text. If not just an automated follow up, and Dr Cardoza has a specific reason or some new data to specifically require this test, please let me know. As we discussed, I am in the process of scheduling my “regular” 3 year colonoscopy with Dr Barnard. If this is just an automated text, there is no need to reply to me.     Duncan Foy, this is [facility]. Mc Cardoza MD would like you to schedule a Colorectal Cancer Screening.    Click [email or web address] for next steps to schedule.    Reply REMOVE to opt out of this type of message.

## 2024-04-05 ENCOUNTER — HOSPITAL ENCOUNTER (OUTPATIENT)
Dept: CV DIAGNOSTICS | Facility: HOSPITAL | Age: 69
Discharge: HOME | End: 2024-04-05
Attending: INTERNAL MEDICINE
Payer: MEDICARE

## 2024-04-05 ENCOUNTER — LAB ENCOUNTER (OUTPATIENT)
Dept: LAB | Facility: HOSPITAL | Age: 69
End: 2024-04-05
Attending: INTERNAL MEDICINE
Payer: MEDICARE

## 2024-04-05 ENCOUNTER — HOSPITAL ENCOUNTER (OUTPATIENT)
Dept: ULTRASOUND IMAGING | Facility: HOSPITAL | Age: 69
Discharge: HOME | End: 2024-04-05
Attending: INTERNAL MEDICINE
Payer: MEDICARE

## 2024-04-05 DIAGNOSIS — R06.00 DYSPNEA, PAROXYSMAL NOCTURNAL: ICD-10-CM

## 2024-04-05 DIAGNOSIS — I65.22 OCCLUSION OF LEFT CAROTID ARTERY: ICD-10-CM

## 2024-04-05 DIAGNOSIS — I34.1 MVP (MITRAL VALVE PROLAPSE): ICD-10-CM

## 2024-04-05 DIAGNOSIS — I10 HTN (HYPERTENSION): ICD-10-CM

## 2024-04-05 DIAGNOSIS — I34.0 MITRAL REGURGITATION: ICD-10-CM

## 2024-04-05 DIAGNOSIS — I10 ESSENTIAL HYPERTENSION, MALIGNANT: ICD-10-CM

## 2024-04-05 DIAGNOSIS — I77.9 DISEASE OF ARTERY (HCC): Primary | ICD-10-CM

## 2024-04-05 LAB — NT-PROBNP SERPL-MCNC: 87 PG/ML (ref ?–125)

## 2024-04-05 PROCEDURE — 83880 ASSAY OF NATRIURETIC PEPTIDE: CPT

## 2024-04-05 PROCEDURE — 36415 COLL VENOUS BLD VENIPUNCTURE: CPT

## 2024-04-05 PROCEDURE — 93306 TTE W/DOPPLER COMPLETE: CPT | Performed by: INTERNAL MEDICINE

## 2024-04-05 PROCEDURE — 93880 EXTRACRANIAL BILAT STUDY: CPT | Performed by: INTERNAL MEDICINE

## 2024-04-24 DIAGNOSIS — R73.03 PREDIABETES: Primary | ICD-10-CM

## 2024-04-24 RX ORDER — BLOOD SUGAR DIAGNOSTIC
STRIP MISCELLANEOUS
Qty: 100 EACH | Refills: 3 | Status: SHIPPED | OUTPATIENT
Start: 2024-04-24

## 2024-04-26 ENCOUNTER — TELEPHONE (OUTPATIENT)
Dept: INTERNAL MEDICINE CLINIC | Facility: CLINIC | Age: 69
End: 2024-04-26

## 2024-04-26 RX ORDER — BLOOD-GLUCOSE METER
EACH MISCELLANEOUS
Qty: 1 KIT | Refills: 0 | Status: SHIPPED | OUTPATIENT
Start: 2024-04-26

## 2024-04-30 ENCOUNTER — MED REC SCAN ONLY (OUTPATIENT)
Dept: INTERNAL MEDICINE CLINIC | Facility: CLINIC | Age: 69
End: 2024-04-30

## 2024-05-01 ENCOUNTER — MED REC SCAN ONLY (OUTPATIENT)
Dept: INTERNAL MEDICINE CLINIC | Facility: CLINIC | Age: 69
End: 2024-05-01

## 2024-05-10 ENCOUNTER — PATIENT MESSAGE (OUTPATIENT)
Dept: INTERNAL MEDICINE CLINIC | Facility: CLINIC | Age: 69
End: 2024-05-10

## 2024-05-10 NOTE — TELEPHONE ENCOUNTER
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 You should be able to see testing results in [facility]. Good news is that IgM was now normal. All other results were also normal except for low antibodies to pneumonia.     Dr Ham asks which vaccines I have had before. I had Prevnar 13 on 1/21/2015. I had another pneumonia vaccine on 3/27/2018, but I do not recall what type. Do you know from my records? He suggests Pneumovax 23 assuming that I already had Prevnar 20. I am not sure.     The following is an excerpt from his my chart message to me:    \"Overall, your immune system appears to be working very well. 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 You do need to get another pneumonia vaccine. I would suggest that you get Pneumovax 23 and we will measure postimmunization titers 4 weeks after you get that vaccine. Since you are 68 years of age, the recommendation at this time is to get Prevnar 20. You have already had Prevnar 20, I believe, and therefore you would need Pneumovax 23. If that is incorrect, please notify me. If there is difficulty finding a pharmacy that provides Pneumovax 23, please call my office and we can arrange to have you get the Pneumovax 23 in this office. I will provide the orders for the post immunization antibody levels.\"     Do you agree that I should get Pneumovax 23?

## 2024-05-10 NOTE — TELEPHONE ENCOUNTER
Patient asking if he needs further pneumonia vaccines.     Prevnar 13 given        2015  Pneumovax 23 given  2018    Shell LOBO

## 2024-06-07 ENCOUNTER — MED REC SCAN ONLY (OUTPATIENT)
Dept: INTERNAL MEDICINE CLINIC | Facility: CLINIC | Age: 69
End: 2024-06-07

## 2024-06-21 DIAGNOSIS — E87.6 HYPOKALEMIA: ICD-10-CM

## 2024-06-21 DIAGNOSIS — R73.03 PRE-DIABETES: Primary | ICD-10-CM

## 2024-07-02 PROBLEM — D12.0 BENIGN NEOPLASM OF CECUM: Status: ACTIVE | Noted: 2024-07-02

## 2024-07-02 PROBLEM — Z86.010 HISTORY OF ADENOMATOUS POLYP OF COLON: Status: ACTIVE | Noted: 2024-07-02

## 2024-07-02 PROBLEM — Z86.0101 HISTORY OF ADENOMATOUS POLYP OF COLON: Status: ACTIVE | Noted: 2024-07-02

## 2024-07-02 PROBLEM — K21.00 REFLUX ESOPHAGITIS: Status: ACTIVE | Noted: 2024-07-02

## 2024-07-02 PROBLEM — D12.2 BENIGN NEOPLASM OF ASCENDING COLON: Status: ACTIVE | Noted: 2024-07-02

## 2024-07-15 ENCOUNTER — MED REC SCAN ONLY (OUTPATIENT)
Dept: INTERNAL MEDICINE CLINIC | Facility: CLINIC | Age: 69
End: 2024-07-15

## 2024-07-28 ENCOUNTER — PATIENT MESSAGE (OUTPATIENT)
Dept: INTERNAL MEDICINE CLINIC | Facility: CLINIC | Age: 69
End: 2024-07-28

## 2024-07-29 NOTE — TELEPHONE ENCOUNTER
From: Law Gerber  To: Mc Cardoza  Sent: 7/28/2024 12:02 PM CDT  Subject: Travel to [place]    I have a series of vacation trips to [place] in august and to [place] in september. I have a few related comments and questions:    1. On july 18, 2024, I received the moderna covid booster. Please update my records.     2. Can I take pepto bimso or the generic equivalent while on eliquis? I have done it in the past, but read the label once and it raised some questions in my mind. If I cant take that, what can I take for indigestion or diarrhea?     3. Once before you were kind enough to give me an Rx in case I get covid while traveling. Can you please do that again? I am afraid that I would be unable to get an Rx in [place] or [place] if needed.

## 2024-08-13 ENCOUNTER — PATIENT MESSAGE (OUTPATIENT)
Dept: INTERNAL MEDICINE CLINIC | Facility: CLINIC | Age: 69
End: 2024-08-13

## 2024-08-13 ENCOUNTER — TELEPHONE (OUTPATIENT)
Dept: INTERNAL MEDICINE CLINIC | Facility: CLINIC | Age: 69
End: 2024-08-13

## 2024-08-13 ENCOUNTER — VIRTUAL PHONE E/M (OUTPATIENT)
Dept: INTERNAL MEDICINE CLINIC | Facility: CLINIC | Age: 69
End: 2024-08-13
Payer: MEDICARE

## 2024-08-13 DIAGNOSIS — U07.1 COVID: Primary | ICD-10-CM

## 2024-08-13 PROCEDURE — 99442 PHONE E/M BY PHYS 11-20 MIN: CPT | Performed by: INTERNAL MEDICINE

## 2024-08-13 NOTE — TELEPHONE ENCOUNTER
Patient tested Covid positive this past weekend after returning from [place]. He states he has very mild symptoms, but would like to speak to Dr. Cardoza.  Please call.

## 2024-08-13 NOTE — TELEPHONE ENCOUNTER
From: Law Gerber  To: Mc Cardoza  Sent: 8/13/2024 6:59 AM CDT  Subject: Positive covid test. Minor symptoms    At the tail end of our trip to the [facility], on sunday night (2 days ago) I had a 99 degree fever so I tested for covid and it was positive. My only symptoms were the small fever, some nasal burning, and very very minor sore throat. With hindsight, I may have had the minor nasal and throat symptoms saturday.     I didnt take anything except tylenol and wore an N95 mask and came home.     My temp WITHOUT tylenol for 24 hours is 99. I have some minor nasal congestion and very minor sore throat when i swallow.     I did call my doctor friend from [place] who is a covid expert and he was inclined to not treat it with medication given the mild symptoms. That makes sense to me, but I value your perspective.     Leanne was with me the whole time and so far has no symptoms.

## 2024-08-14 NOTE — PROGRESS NOTES
Virtual Telephone Check-In    Law Gerber verbally consents to a Virtual/Telephone Check-In visit on 08/13/24.  Patient has been referred to the [facility] website at [email or web address] to review the yearly Consent to Treat document.    Patient understands and accepts financial responsibility for any deductible, co-insurance and/or co-pays associated with this service.    Duration of the service: 15 minutes audio only      Summary of topics discussed:     URI symptoms on return from [employer] trip. Wife ok (not my pt). No prior hx Covid. Sounds congested with mild cough on phone, no fever reported. Has Molnupiravir that he filled but never [id number] be safer than Paxlovid with his meds. Nurse call later this week.         Mc Cardoza MD

## 2024-08-16 ENCOUNTER — TELEPHONE (OUTPATIENT)
Dept: INTERNAL MEDICINE CLINIC | Facility: CLINIC | Age: 69
End: 2024-08-16

## 2024-08-16 NOTE — TELEPHONE ENCOUNTER
[phone]   Spoke with patient  States that he is feeling much better  Denies fever, diarrhea, cough, SOB, chest pain.  Has been taking nasal decongestant which helped.  Some nasal congestion in morning.  Advised humidifier in bedroom at night if not already using.  Discussed nasal saline and Nedipot.  Patient will call back if any questions or worsening of symptoms.    Routed to PCP as MARYELLEN

## 2024-08-20 ENCOUNTER — TELEPHONE (OUTPATIENT)
Dept: INTERNAL MEDICINE CLINIC | Facility: CLINIC | Age: 69
End: 2024-08-20

## 2024-08-20 ENCOUNTER — OFFICE VISIT (OUTPATIENT)
Dept: INTERNAL MEDICINE CLINIC | Facility: CLINIC | Age: 69
End: 2024-08-20
Payer: MEDICARE

## 2024-08-20 VITALS
SYSTOLIC BLOOD PRESSURE: 134 MMHG | TEMPERATURE: 98 F | DIASTOLIC BLOOD PRESSURE: 58 MMHG | HEIGHT: 71 IN | RESPIRATION RATE: 16 BRPM | HEART RATE: 80 BPM | WEIGHT: 186.38 LBS | OXYGEN SATURATION: 97 % | BODY MASS INDEX: 26.09 KG/M2

## 2024-08-20 DIAGNOSIS — I49.3 PVC'S (PREMATURE VENTRICULAR CONTRACTIONS): ICD-10-CM

## 2024-08-20 DIAGNOSIS — J01.00 ACUTE NON-RECURRENT MAXILLARY SINUSITIS: Primary | ICD-10-CM

## 2024-08-20 DIAGNOSIS — U09.9 POST-COVID-19 CONDITION: ICD-10-CM

## 2024-08-20 PROCEDURE — 99213 OFFICE O/P EST LOW 20 MIN: CPT | Performed by: INTERNAL MEDICINE

## 2024-08-20 RX ORDER — DOXYCYCLINE HYCLATE 100 MG
100 TABLET ORAL 2 TIMES DAILY
Qty: 20 TABLET | Refills: 0 | Status: SHIPPED | OUTPATIENT
Start: 2024-08-20

## 2024-08-20 RX ORDER — LISINOPRIL 20 MG/1
20 TABLET ORAL DAILY
Qty: 90 TABLET | Refills: 3 | Status: SHIPPED | OUTPATIENT
Start: 2024-08-20

## 2024-08-20 RX ORDER — AMLODIPINE BESYLATE 5 MG/1
5 TABLET ORAL DAILY
COMMUNITY
Start: 2024-07-15

## 2024-08-20 NOTE — PROGRESS NOTES
Subjective:   Patient ID: Law Gerber is a 69 year old male.quick visit for sinus congestion post covid flying tomorrow    Flying to [place] tomorrow. Finished Paxlovid for Covid 4-5 days ago, got better, now sinus congestion worried about flying. No cough or fever otherwise much better.         History/Other:   Review of Systems   Constitutional:  Positive for fatigue (from metoprolol).   HENT:  Positive for congestion (worsening sinus since Covid) and postnasal drip.         Tonsillectomy, old  facial scar   Eyes:  Positive for visual disturbance (floaters, retinal tears).   Respiratory:  Positive for apnea (on CPAP) and cough (better after Covid Rx).    Cardiovascular:  Positive for palpitations.        Lipids, Mitral valve repair, hx rheumatic fever, afib-ablation  Saw cardio Giovani for PVCs today-cleared to fly   Gastrointestinal:  Positive for diarrhea (occasional).        Colon polyps, hemorrhoids, liver cysts, GB out, umbilical and inguinal hernias, reflux, diverticulosis   Endocrine:        Prediabetes, thyroid ok   Genitourinary:  Positive for difficulty urinating.        BPH with elevated PSA neg bx, kidney cysts, kidney stone, hematuria, contusion left kidney, hx retrograde ejaculation or no ejaculation   Musculoskeletal:  Positive for arthralgias (occasional) and back pain (occasional).        Occasional lumbar disc, knee problems   Skin: Negative.    Allergic/Immunologic:        See list   Neurological:  Positive for dizziness (hx BPPV 3/2014).   Hematological:  Bruises/bleeds easily.        Thal trait, low immunoglobulins   Psychiatric/Behavioral: Negative.         Current Outpatient Medications   Medication Sig Dispense Refill    amLODIPine 5 MG Oral Tab Take 1 tablet (5 mg total) by mouth daily.      Doxycycline Hyclate 100 MG Oral Tab Take 1 tablet (100 mg total) by mouth 2 (two) times daily. 20 tablet 0    metFORMIN 500 MG Oral Tab Take 1 tablet (500 mg total) by mouth 2 (two) times  daily with meals. 180 tablet 3    lisinopril 20 MG Oral Tab Take 1 tablet (20 mg total) by mouth daily. 90 tablet 3    Blood Glucose Monitoring Suppl (ACCU-CHEK GUIDE) w/Device Does not apply Kit Check blood sugar once a day 1 kit 0    Glucose Blood (ONETOUCH ULTRA) In Vitro Strip TEST BLOOD SUGAR ONCE DAILY 100 each 3    Cholecalciferol (VITAMIN D-3) 25 MCG (1000 UT) Oral Cap Take 1 capsule by mouth daily.      Lutein-Zeaxanthin 20-1 MG Oral Cap lutein 20 mg-zeaxanthin 1,000 mcg capsule, [RxNorm: 3622092]      Cholecalciferol 50 MCG (2000 UT) Oral Tab Take 1 tablet (2,000 Units total) by mouth daily.      Accu-Chek FastClix Lancets Does not apply Misc USE ONCE DAILY AS DIRECTED 102 each 3    Clindamycin Phosphate 1 % External Gel Apply 1 Application topically 2 (two) times daily. Apply to scalp bid 30 g 3    rosuvastatin 5 MG Oral Tab Take 1 tablet (5 mg total) by mouth nightly. 90 tablet 3    Metoprolol Succinate ER 50 MG Oral Tablet 24 Hr Take 1 tablet (50 mg total) by mouth daily.      ELIQUIS 5 MG Oral Tab TAKE 1 TABLET BY MOUTH TWICE DAILY 180 tablet 1    Multiple Vitamins-Minerals (COMPLETE DAILY/LUTEIN) Oral Tab Take by mouth.      clindamycin 300 MG Oral Cap TAKE 2 CAPSULES BY MOUTH 1 HOURS PRIOR TO DENTAL APPOINTMENT       Allergies:  Allergies   Allergen Reactions    Flomax [Tamsulosin] DIZZINESS    Aleve RASH    Levaquin [Levofloxacin Hemihydrate] DIARRHEA    Penicillins RASH and OTHER (SEE COMMENTS)    Vancomycin UNKNOWN       Objective:   Physical Exam  Constitutional:       General: He is not in acute distress.     Appearance: He is not ill-appearing.   HENT:      Right Ear: Tympanic membrane normal.      Left Ear: Tympanic membrane normal.      Ears:      Comments: Mild wax     Nose: Congestion present.      Mouth/Throat:      Pharynx: Oropharynx is clear.   Eyes:      Conjunctiva/sclera: Conjunctivae normal.   Cardiovascular:      Rate and Rhythm: Normal rate and regular rhythm.      Heart sounds:  Murmur (faint sys murmurhx MVR-ok) heard.      Comments: Sternal scar  Pulmonary:      Breath sounds: Normal breath sounds.   Abdominal:      Tenderness: There is no abdominal tenderness.   Musculoskeletal:         General: Normal range of motion.      Cervical back: Normal range of motion.      Comments: Feet ok not diabetic   Skin:     Comments: normal   Neurological:      Mental Status: He is alert. Mental status is at baseline.   Psychiatric:         Mood and Affect: Mood normal.         Thought Content: Thought content normal.         Assessment & Plan:   1. Acute non-recurrent maxillary sinusitis    2. Post-COVID-19 condition    3. PVC's (premature ventricular contractions)      Ok to fly tomorrow  Rx Doxy, Afrin, Mucinex, Claritin plain  Agree with dec Lisinopril 40 to 20 to lessen ACE sinus issue  RTC on return from [place] if no better      Meds This Visit:  Requested Prescriptions     Signed Prescriptions Disp Refills    Doxycycline Hyclate 100 MG Oral Tab 20 tablet 0     Sig: Take 1 tablet (100 mg total) by mouth 2 (two) times daily.    metFORMIN 500 MG Oral Tab 180 tablet 3     Sig: Take 1 tablet (500 mg total) by mouth 2 (two) times daily with meals.    lisinopril 20 MG Oral Tab 90 tablet 3     Sig: Take 1 tablet (20 mg total) by mouth daily.       Imaging & Referrals:  None

## 2024-08-20 NOTE — TELEPHONE ENCOUNTER
Patient is recovering from Covid about 10 days ago. He is experiencing discomfort in the sinus area above is teeth, and he states that he has post nasal drip, which is slightly cloudy. He would like to know if Dr. Cardoza will prescribe an antibiotic for him for a sinus infection.  Please call to discuss an appropriate antibiotic because he has side effects from some of the medication.

## 2024-08-20 NOTE — TELEPHONE ENCOUNTER
[phone]   Spoke with patient  States that he had COVID about 10 days ago.  Was having palpitations and saw Dr Whitaker  Pain in teeth and eyes now as well.  Post nasal drip.  Advised patient be seen in office visit.  Patient will be flying on plane tomorrow.  Per PCP patient will be seen in office visit at 3pm today

## 2024-09-11 ENCOUNTER — TELEPHONE (OUTPATIENT)
Dept: INTERNAL MEDICINE CLINIC | Facility: CLINIC | Age: 69
End: 2024-09-11

## 2024-09-11 ENCOUNTER — PATIENT MESSAGE (OUTPATIENT)
Dept: INTERNAL MEDICINE CLINIC | Facility: CLINIC | Age: 69
End: 2024-09-11

## 2024-09-11 RX ORDER — DOXYCYCLINE HYCLATE 100 MG
100 TABLET ORAL 2 TIMES DAILY
Qty: 14 TABLET | Refills: 0 | Status: SHIPPED | OUTPATIENT
Start: 2024-09-11 | End: 2024-09-18

## 2024-09-11 NOTE — TELEPHONE ENCOUNTER
[phone]   Spoke with patient  Advised patient of provider comments and recommendations.  Patient informed and verbalized understanding.

## 2024-09-11 NOTE — TELEPHONE ENCOUNTER
Patient called back and would like to come in for an appointment to get an antibiotic. Advised patient that both doctors are out of the office today. Dr. Romero has available appointments on Friday in the afternoon. Please triage patient.

## 2024-09-11 NOTE — TELEPHONE ENCOUNTER
Patient states that he recently recovered from COVID a few weeks ago.  Grand kids recently had colds.  Has been sick over last 5-6 days.  Slight dry cough with yellow sputum.  States that previous time he had COVID he developed a sinus infection and was prescribed Doxycycline.  Would like to know if this could be given again.    Provider please advise on pended script

## 2024-09-11 NOTE — TELEPHONE ENCOUNTER
Script signed for Doxycycline.  I adjusted down the duration of pharmacotherapy to 7 days.  Please notify pt.  He should follow up with Dr. Cardoza if he remains symptomatic after treatment concludes.       Romeo Romero MD  [facility] Physician  Diplomate, American Board of Internal Medicine  Member, American College of Lifestyle Medicine  Member, American Association for Physician [address], [address] [address] [phone] (phone); [phone] (fax)  [email or web address]

## 2024-09-13 ENCOUNTER — OFFICE VISIT (OUTPATIENT)
Dept: INTERNAL MEDICINE CLINIC | Facility: CLINIC | Age: 69
End: 2024-09-13
Payer: MEDICARE

## 2024-09-13 ENCOUNTER — HOSPITAL ENCOUNTER (OUTPATIENT)
Dept: GENERAL RADIOLOGY | Facility: HOSPITAL | Age: 69
Discharge: HOME | End: 2024-09-13
Attending: INTERNAL MEDICINE
Payer: MEDICARE

## 2024-09-13 ENCOUNTER — TELEPHONE (OUTPATIENT)
Dept: INTERNAL MEDICINE CLINIC | Facility: CLINIC | Age: 69
End: 2024-09-13

## 2024-09-13 VITALS
HEIGHT: 71 IN | BODY MASS INDEX: 26.3 KG/M2 | HEART RATE: 77 BPM | OXYGEN SATURATION: 99 % | DIASTOLIC BLOOD PRESSURE: 68 MMHG | RESPIRATION RATE: 18 BRPM | TEMPERATURE: 98 F | SYSTOLIC BLOOD PRESSURE: 136 MMHG | WEIGHT: 187.88 LBS

## 2024-09-13 DIAGNOSIS — J06.9 UPPER RESPIRATORY TRACT INFECTION, UNSPECIFIED TYPE: ICD-10-CM

## 2024-09-13 DIAGNOSIS — J06.9 UPPER RESPIRATORY TRACT INFECTION, UNSPECIFIED TYPE: Primary | ICD-10-CM

## 2024-09-13 PROBLEM — U07.1 COVID: Status: RESOLVED | Noted: 2024-08-13 | Resolved: 2024-09-13

## 2024-09-13 PROCEDURE — 99214 OFFICE O/P EST MOD 30 MIN: CPT | Performed by: INTERNAL MEDICINE

## 2024-09-13 PROCEDURE — 71046 X-RAY EXAM CHEST 2 VIEWS: CPT | Performed by: INTERNAL MEDICINE

## 2024-09-13 RX ORDER — TRIAMCINOLONE ACETONIDE 55 UG/1
SPRAY, METERED NASAL
Qty: 10.8 ML | Refills: 0 | Status: SHIPPED | OUTPATIENT
Start: 2024-09-13

## 2024-09-13 NOTE — PROGRESS NOTES
The 21st Century Cures Act makes medical notes like these available to patients in the interest of transparency. Please be advised this is a medical document. Medical documents are intended to carry relevant information, facts as evident, and the clinical opinion of the practitioner. The medical note is intended as peer to peer communication and may appear blunt or direct. It is written in medical language and may contain abbreviations or verbiage that are unfamiliar.            MEDICINE      Chief Complaint   Patient presents with    Cough     Cough with phlegm sometimes dry x 8-9 days     Ear Problem       HPI: Trever presents today for evaluation of several concerns.  He is having a lot of pressure in his ears.  He is also been experiencing yellow/peach sputum and yellow nasal discharge.  He was given antibiotics on Wednesday, specifically doxycycline that I signed off on, and after he reached out to our office for further advice.  Further history states that he recently recovered from COVID about 1 month ago.  His grandkids recently had upper respiratory infections.  He had been sick over the past several days.  He is also describing a slight dry cough with yellow sputum with the slightest of blood-tinged appearance..  He has an upcoming trip to [place].    Review of Systems   All other systems reviewed and are negative.    Patient Active Problem List   Diagnosis    CHIDI (obstructive sleep apnea)    Alpha thalassemia 2    Kidney cysts    Liver cyst    Essential hypertension    Diverticulosis    BPH with elevated PSA    Hemorrhoids, internal    Dyslipidemia    Lumbar herniated disc    Kidney stone on right side    Mitral valve disease    BPPV (benign paroxysmal positional vertigo)    Bilateral inguinal hernia    Prediabetes    Family history of colon cancer    History of pneumonia    History of colon polyps    PAF (paroxysmal atrial fibrillation) (HCC)    Gastro-esophageal reflux disease with esophagitis     Gastritis    Colon polyps    Third degree hemorrhoids    Hematuria    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)    Low serum IgE    HLD (hyperlipidemia)    Reflux esophagitis    History of adenomatous polyp of colon    Benign neoplasm of cecum    Benign neoplasm of ascending colon       Flomax [tamsulosin], Aleve, Levaquin [levofloxacin hemihydrate], Penicillins, and Vancomycin      Current Outpatient Medications:     triamcinolone 55 MCG/ACT Nasal Aerosol, Take 1 spray daily as needed in each nostril to decongest the sinuses and ears; or take 45 minutes prior to airplane take-off for similar decongesting effect., Disp: 10.8 mL, Rfl: 0    Doxycycline Hyclate 100 MG Oral Tab, Take 1 tablet (100 mg total) by mouth 2 (two) times daily for 7 days. Take for acute sinus infection., Disp: 14 tablet, Rfl: 0    amLODIPine 5 MG Oral Tab, Take 1 tablet (5 mg total) by mouth daily., Disp: , Rfl:     metFORMIN 500 MG Oral Tab, Take 1 tablet (500 mg total) by mouth 2 (two) times daily with meals., Disp: 180 tablet, Rfl: 3    lisinopril 20 MG Oral Tab, Take 1 tablet (20 mg total) by mouth daily., Disp: 90 tablet, Rfl: 3    Blood Glucose Monitoring Suppl (ACCU-CHEK GUIDE) w/Device Does not apply Kit, Check blood sugar once a day, Disp: 1 kit, Rfl: 0    Glucose Blood (ONETOUCH ULTRA) In Vitro Strip, TEST BLOOD SUGAR ONCE DAILY, Disp: 100 each, Rfl: 3    Cholecalciferol (VITAMIN D-3) 25 MCG (1000 UT) Oral Cap, Take 1 capsule by mouth daily., Disp: , Rfl:     Lutein-Zeaxanthin 20-1 MG Oral Cap, lutein 20 mg-zeaxanthin 1,000 mcg capsule, [RxNorm: [id number]], Disp: , Rfl:     Cholecalciferol 50 MCG (2000 UT) Oral Tab, Take 1 tablet (2,000 Units total) by mouth daily., Disp: , Rfl:     Accu-Chek FastClix Lancets Does not apply Misc, USE ONCE DAILY AS DIRECTED, Disp: 102 each, Rfl: 3    Clindamycin Phosphate 1 % External Gel, Apply 1 Application topically 2 (two) times daily. Apply to scalp bid, Disp: 30 g, Rfl: 3    rosuvastatin 5 MG Oral Tab,  Take 1 tablet (5 mg total) by mouth nightly., Disp: 90 tablet, Rfl: 3    Metoprolol Succinate ER 50 MG Oral Tablet 24 Hr, Take 1 tablet (50 mg total) by mouth daily., Disp: , Rfl:     ELIQUIS 5 MG Oral Tab, TAKE 1 TABLET BY MOUTH TWICE DAILY, Disp: 180 tablet, Rfl: 1    Multiple Vitamins-Minerals (COMPLETE DAILY/LUTEIN) Oral Tab, Take by mouth., Disp: , Rfl:     Social History     Socioeconomic History    Marital status:      Spouse name: Leanne    Number of children: 4   Occupational History    Occupation: CPA     Comment: retired   Tobacco Use    Smoking status: Never    Smokeless tobacco: Never    Tobacco comments:     none   Vaping Use    Vaping status: Never Used   Substance and Sexual Activity    Alcohol use: Never    Drug use: Never   Other Topics Concern    Caffeine Concern No    Stress Concern No    Weight Concern No    Special Diet No    Exercise Yes    Seat Belt Yes     Social Determinants of Health     Physical Activity: Inactive (1/11/2021)    Received from [employer], [facility] [employer]    Exercise Vital Sign     Days of Exercise per Week: 0 days     Minutes of Exercise per Session: 0 min    Received from [facility], [facility]    Housing Stability       PE:  /68   Pulse 77   Temp 98.2 °F (36.8 °C) (Temporal)   Resp 18   Ht 5' 11\" (1.803 m)   Wt 187 lb 14.4 oz (85.2 kg)   SpO2 99%   BMI 26.21 kg/m²   Gen - A&Ox3, NAD  HEENT - PERRL, EOMI, OP is clear; TMs clear B  Neck - supple, no JVD, no thyromegaly, carotids are 2+ and w/o bruits  Lymph - no palp LAD  Lungs - CTAB  Psych - nl mood/affect      A/P:  Encounter Diagnosis   Name Primary?    Upper respiratory tract infection, unspecified type Yes     Discussion took place.  At this point because of the evidence of blood-tinged sputum, I would like him to go for chest x-ray.  This is still probably a viral URI.  Nonetheless, since he was started on doxycycline several days ago, he  should complete the course of treatment.  I did send him a prescription for triamcinolone nasal spray (Nasacort) to help with concomitant rhinitis and ear congestion type symptoms that could be also reflective of a eustachian tube dysfunction (ETD).  Additionally, he can utilize this medication roughly 45 minutes prior to takeoff when he leaves for [place] on Monday to help minimize any ETD that can accompany flight travel.  RTC as needed or next regularly scheduled office visit.  He verbally agrees to and understands the plan as outlined above. He was also afforded the time and opportunity to ask questions, which were then answered to the best of my ability.        Romeo Romero MD  [facility] Physician  Diplomate, American Board of Internal Medicine  Member, American College of Lifestyle Medicine  Member, American Association for Physician Leadership  [address], [address], [place] [address]  [phone] (phone); [phone] (fax)  [email or web address]           Meds & Refills for this Visit:  Requested Prescriptions     Signed Prescriptions Disp Refills    triamcinolone 55 MCG/ACT Nasal Aerosol 10.8 mL 0     Sig: Take 1 spray daily as needed in each nostril to decongest the sinuses and ears; or take 45 minutes prior to airplane take-off for similar decongesting effect.       Imaging & Consults:  XR CHEST PA + LAT CHEST (CPT=71046)

## 2024-09-13 NOTE — TELEPHONE ENCOUNTER
Patient calling in  States that he is having a lot of pressure in his ears.  Experiencing yellow/peach sputum and yellow nasal discharge.  Was given antibiotics on Wednesday.  Asking to come in to be seen and have ears looked at and lungs listen to.  Explained that this is possibly viral and why the antibiotics are not helping.  States that he is travelling on Monday and concerned that he is not feeling better.  Would like to be seen to have ears looked at and lungs listen to.  Appointment scheduled.    Future Appointments   Date Time Provider Department Center   9/13/2024  2:00 PM Romeo Romero MD [facility] [place]   10/11/2024  9:30 AM Deep Simmons MD [facility]

## 2024-09-28 ENCOUNTER — PATIENT MESSAGE (OUTPATIENT)
Dept: INTERNAL MEDICINE CLINIC | Facility: CLINIC | Age: 69
End: 2024-09-28

## 2024-09-28 DIAGNOSIS — I10 ESSENTIAL HYPERTENSION: ICD-10-CM

## 2024-09-28 DIAGNOSIS — R73.03 PREDIABETES: Primary | ICD-10-CM

## 2024-09-28 DIAGNOSIS — E87.5 HYPERKALEMIA: ICD-10-CM

## 2024-09-28 DIAGNOSIS — I48.0 PAF (PAROXYSMAL ATRIAL FIBRILLATION) (HCC): ICD-10-CM

## 2024-09-30 NOTE — TELEPHONE ENCOUNTER
From: Law Gerber  To: Mc Cardoza  Sent: 9/28/2024 6:41 AM CDT  Subject: Blood tests; update    A few misc items  1. For my file, I had my flu shot 9/27/24  2. For my file, I had a “routine” echocardiogram done 9/27/24 for Jacque Yeager at [facility] as a follow up to one done 6 months ago.   3. Toy ordered routine bloodwork for me for my upcoming visit that includes:  NT-ProBNP, lipid panel, CBC, CRP high sensitivity. IS THERE ANYTHING YOU WOULD LIKE TO ADD TO THE BLLOD TESTS ORDER? perhaps check my potassium (if not already covered by CBC) and my A1C? If so, can you enter the order? I will go “downstairs” by you.     Thanks.

## 2024-09-30 NOTE — TELEPHONE ENCOUNTER
Dr Cardoza advised the following:  Yes add CMP and A1C and Mag     Orders placed.    [employer] message sent to advise patient

## 2024-10-03 ENCOUNTER — LAB ENCOUNTER (OUTPATIENT)
Dept: LAB | Age: 69
End: 2024-10-03
Attending: INTERNAL MEDICINE
Payer: MEDICARE

## 2024-10-03 DIAGNOSIS — E78.00 PURE HYPERCHOLESTEROLEMIA: ICD-10-CM

## 2024-10-03 DIAGNOSIS — R06.00 DYSPNEA: Primary | ICD-10-CM

## 2024-10-03 DIAGNOSIS — I10 ESSENTIAL HYPERTENSION, MALIGNANT: ICD-10-CM

## 2024-10-03 DIAGNOSIS — I10 ESSENTIAL HYPERTENSION: ICD-10-CM

## 2024-10-03 DIAGNOSIS — I48.0 PAF (PAROXYSMAL ATRIAL FIBRILLATION) (HCC): ICD-10-CM

## 2024-10-03 DIAGNOSIS — R73.03 PREDIABETES: ICD-10-CM

## 2024-10-03 DIAGNOSIS — I27.21 PULMONARY ARTERY HYPERTENSION (HCC): ICD-10-CM

## 2024-10-03 DIAGNOSIS — E55.9 VITAMIN D DEFICIENCY: ICD-10-CM

## 2024-10-03 DIAGNOSIS — I34.1 MITRAL VALVE PROLAPSE: ICD-10-CM

## 2024-10-03 DIAGNOSIS — E87.5 HYPERKALEMIA: ICD-10-CM

## 2024-10-03 LAB
ALBUMIN SERPL-MCNC: 5 G/DL (ref 3.2–4.8)
ALBUMIN/GLOB SERPL: 1.9 {RATIO} (ref 1–2)
ALP LIVER SERPL-CCNC: 56 U/L
ALT SERPL-CCNC: 33 U/L
ANION GAP SERPL CALC-SCNC: 6 MMOL/L (ref 0–18)
AST SERPL-CCNC: 24 U/L (ref ?–34)
BASOPHILS # BLD AUTO: 0.07 X10(3) UL (ref 0–0.2)
BASOPHILS NFR BLD AUTO: 1 %
BILIRUB SERPL-MCNC: 1 MG/DL (ref 0.2–1.1)
BUN BLD-MCNC: 19 MG/DL (ref 9–23)
CALCIUM BLD-MCNC: 10.5 MG/DL (ref 8.7–10.4)
CHLORIDE SERPL-SCNC: 106 MMOL/L (ref 98–112)
CHOLEST SERPL-MCNC: 142 MG/DL (ref ?–200)
CO2 SERPL-SCNC: 29 MMOL/L (ref 21–32)
CREAT BLD-MCNC: 0.89 MG/DL
CRP SERPL HS-MCNC: 1.25 MG/L (ref ?–3)
EGFRCR SERPLBLD CKD-EPI 2021: 93 ML/MIN/1.73M2 (ref 60–?)
EOSINOPHIL # BLD AUTO: 0.48 X10(3) UL (ref 0–0.7)
EOSINOPHIL NFR BLD AUTO: 6.8 %
ERYTHROCYTE [DISTWIDTH] IN BLOOD BY AUTOMATED COUNT: 14.8 %
EST. AVERAGE GLUCOSE BLD GHB EST-MCNC: 134 MG/DL (ref 68–126)
FASTING PATIENT LIPID ANSWER: YES
FASTING STATUS PATIENT QL REPORTED: YES
GLOBULIN PLAS-MCNC: 2.6 G/DL (ref 2–3.5)
GLUCOSE BLD-MCNC: 118 MG/DL (ref 70–99)
HBA1C MFR BLD: 6.3 % (ref ?–5.7)
HCT VFR BLD AUTO: 45.2 %
HDLC SERPL-MCNC: 63 MG/DL (ref 40–59)
HGB BLD-MCNC: 14.3 G/DL
IMM GRANULOCYTES # BLD AUTO: 0.05 X10(3) UL (ref 0–1)
IMM GRANULOCYTES NFR BLD: 0.7 %
LDLC SERPL CALC-MCNC: 68 MG/DL (ref ?–100)
LYMPHOCYTES # BLD AUTO: 1.57 X10(3) UL (ref 1–4)
LYMPHOCYTES NFR BLD AUTO: 22.3 %
MAGNESIUM SERPL-MCNC: 1.9 MG/DL (ref 1.6–2.6)
MCH RBC QN AUTO: 26.5 PG (ref 26–34)
MCHC RBC AUTO-ENTMCNC: 31.6 G/DL (ref 31–37)
MCV RBC AUTO: 83.7 FL
MONOCYTES # BLD AUTO: 0.64 X10(3) UL (ref 0.1–1)
MONOCYTES NFR BLD AUTO: 9.1 %
NEUTROPHILS # BLD AUTO: 4.24 X10 (3) UL (ref 1.5–7.7)
NEUTROPHILS # BLD AUTO: 4.24 X10(3) UL (ref 1.5–7.7)
NEUTROPHILS NFR BLD AUTO: 60.1 %
NONHDLC SERPL-MCNC: 79 MG/DL (ref ?–130)
NT-PROBNP SERPL-MCNC: 117 PG/ML (ref ?–125)
OSMOLALITY SERPL CALC.SUM OF ELEC: 295 MOSM/KG (ref 275–295)
PLATELET # BLD AUTO: 244 10(3)UL (ref 150–450)
POTASSIUM SERPL-SCNC: 4.4 MMOL/L (ref 3.5–5.1)
PROT SERPL-MCNC: 7.6 G/DL (ref 5.7–8.2)
RBC # BLD AUTO: 5.4 X10(6)UL
SODIUM SERPL-SCNC: 141 MMOL/L (ref 136–145)
TRIGL SERPL-MCNC: 47 MG/DL (ref 30–149)
TSI SER-ACNC: 1.37 MIU/ML (ref 0.55–4.78)
VIT D+METAB SERPL-MCNC: 88.2 NG/ML (ref 30–100)
VLDLC SERPL CALC-MCNC: 7 MG/DL (ref 0–30)
WBC # BLD AUTO: 7.1 X10(3) UL (ref 4–11)

## 2024-10-03 PROCEDURE — 83880 ASSAY OF NATRIURETIC PEPTIDE: CPT

## 2024-10-03 PROCEDURE — 83036 HEMOGLOBIN GLYCOSYLATED A1C: CPT

## 2024-10-03 PROCEDURE — 36415 COLL VENOUS BLD VENIPUNCTURE: CPT

## 2024-10-03 PROCEDURE — 84443 ASSAY THYROID STIM HORMONE: CPT

## 2024-10-03 PROCEDURE — 82306 VITAMIN D 25 HYDROXY: CPT

## 2024-10-03 PROCEDURE — 83735 ASSAY OF MAGNESIUM: CPT

## 2024-10-03 PROCEDURE — 86141 C-REACTIVE PROTEIN HS: CPT

## 2024-10-03 PROCEDURE — 80053 COMPREHEN METABOLIC PANEL: CPT

## 2024-10-03 PROCEDURE — 85025 COMPLETE CBC W/AUTO DIFF WBC: CPT

## 2024-10-03 PROCEDURE — 80061 LIPID PANEL: CPT

## 2024-10-07 ENCOUNTER — TELEPHONE (OUTPATIENT)
Dept: INTERNAL MEDICINE CLINIC | Facility: CLINIC | Age: 69
End: 2024-10-07

## 2024-10-07 NOTE — TELEPHONE ENCOUNTER
Received copy of lab results dated 10.3.24  Copy to scan   [employer] message sent to patient asking if he would like a copy    Shell LOBO

## 2025-01-09 ENCOUNTER — PATIENT MESSAGE (OUTPATIENT)
Dept: INTERNAL MEDICINE CLINIC | Facility: CLINIC | Age: 70
End: 2025-01-09

## 2025-01-09 RX ORDER — TOBRAMYCIN 3 MG/ML
1 SOLUTION/ DROPS OPHTHALMIC 4 TIMES DAILY
Qty: 5 ML | Refills: 0 | Status: SHIPPED | OUTPATIENT
Start: 2025-01-09

## 2025-01-09 NOTE — TELEPHONE ENCOUNTER
Dr Cardoza advised the following:  Ok to send Tobex eyedrops one each eye qid till finished 5ml     Requested Prescriptions     Signed Prescriptions Disp Refills    tobramycin (TOBREX) 0.3 % Ophthalmic Solution 5 mL 0     Sig: Place 1 drop into both eyes in the morning, at noon, in the evening, and at bedtime. Until finished.     Authorizing Provider: CORINA CARDOZA     Ordering User: CATHLEEN DE PAZ message sent to advise patient

## 2025-04-11 ENCOUNTER — DOCUMENTATION ONLY (OUTPATIENT)
Facility: CLINIC | Age: 70
End: 2025-04-11

## 2025-04-15 ENCOUNTER — NURSE ONLY (OUTPATIENT)
Facility: CLINIC | Age: 70
End: 2025-04-15
Payer: MEDICARE

## 2025-04-15 DIAGNOSIS — Z00.00 LABORATORY EXAMINATION ORDERED AS PART OF A ROUTINE GENERAL MEDICAL EXAMINATION: Primary | ICD-10-CM

## 2025-04-15 DIAGNOSIS — R73.03 PREDIABETES: ICD-10-CM

## 2025-04-15 LAB
AMB EXT BILIRUBIN, TOTAL: 0.9 MG/DL
AMB EXT BUN: 8 MG/DL
AMB EXT CALCIUM: 9.8
AMB EXT CARBON DIOXIDE: 24
AMB EXT CHLORIDE: 103
AMB EXT CHOL/HDL RATIO: 57
AMB EXT CHOLESTEROL, TOTAL: 120 MG/DL
AMB EXT CMP ALT: 27 U/L (ref 5.3–27)
AMB EXT CMP AST: 29 U/L (ref 10–35)
AMB EXT CREATININE: 0.8 MG/DL
AMB EXT GLUCOSE: 103 MG/DL
AMB EXT HDL CHOLESTEROL: 63 MG/DL
AMB EXT HGBA1C: 6.2 %
AMB EXT LDL CHOLESTEROL, DIRECT: 43 MG/DL
AMB EXT NON HDL CHOL: 54 MG/DL
AMB EXT POSTASSIUM: 4.2 MMOL/L (ref 4–?)
AMB EXT PSA SCREEN: 0.25 NG/ML
AMB EXT SODIUM: 140 MMOL/L (ref 136–145)
AMB EXT TOTAL PROTEIN: 7 (ref 6.1–8)
AMB EXT TRIGLYCERIDES: 68 MG/DL
AMB EXT TSH: 1.66 MIU/ML
BILIRUB UR QL STRIP.AUTO: NEGATIVE
CLARITY UR REFRACT.AUTO: CLEAR
CREAT UR-SCNC: 111 MG/DL
GLUCOSE UR STRIP.AUTO-MCNC: NORMAL MG/DL
KETONES UR STRIP.AUTO-MCNC: 20 MG/DL
LEUKOCYTE ESTERASE UR QL STRIP.AUTO: NEGATIVE
MICROALBUMIN UR-MCNC: 0.9 MG/DL
MICROALBUMIN/CREAT 24H UR-RTO: 8.1 UG/MG (ref ?–30)
NITRITE UR QL STRIP.AUTO: NEGATIVE
PH UR STRIP.AUTO: 5.5 [PH] (ref 5–8)
PROT UR STRIP.AUTO-MCNC: NEGATIVE MG/DL
SP GR UR STRIP.AUTO: 1.02 (ref 1–1.03)
UROBILINOGEN UR STRIP.AUTO-MCNC: NORMAL MG/DL

## 2025-04-15 PROCEDURE — 81001 URINALYSIS AUTO W/SCOPE: CPT | Performed by: INTERNAL MEDICINE

## 2025-04-15 PROCEDURE — 82043 UR ALBUMIN QUANTITATIVE: CPT | Performed by: INTERNAL MEDICINE

## 2025-04-15 PROCEDURE — 92551 PURE TONE HEARING TEST AIR: CPT | Performed by: INTERNAL MEDICINE

## 2025-04-15 PROCEDURE — 93923 UPR/LXTR ART STDY 3+ LVLS: CPT | Performed by: INTERNAL MEDICINE

## 2025-04-15 PROCEDURE — 82570 ASSAY OF URINE CREATININE: CPT | Performed by: INTERNAL MEDICINE

## 2025-04-15 PROCEDURE — 99173 VISUAL ACUITY SCREEN: CPT | Performed by: INTERNAL MEDICINE

## 2025-04-15 NOTE — PROGRESS NOTES
VENIPUNCTURE: rt arm 1 stick landed    ADD-ON TEST:    EKG:    SPIROMETRY:    URINE: Yes      VISION: [facility] last eye exam 10/2024     Right Eye 20/20      Left Eye 20/25     Both Eyes: 20/20      RANDY:      Right Tibial Foot _162__ divided by highest arm 154___ = __1.05_       Right Dorsal Foot ___ divided by highest arm ___ = ___       Left Tibial Foot __166_ divided by highest arm _154__ = _1.07__       Left Dorsal Foot ___ divided by highest arm ___ = ___      ARM:   Right Brachial-140     Left Brachial-154      FOOT:   Right Tibial (Side)-162    Right Dorsal (Top)-      Left Tibial (Side)-166    Left Dorsal (Top)-      Greater than 1.3: results not reliable  1.01 to 1.3: correlated with history, especially if the patient has diabetes  0.97 to 1: normal  0.8 to 0.96: mild ischemia  0.4 to 0.79: moderate to severe ischemia  0.39 or less: severe ischemia; danger of limb loss

## 2025-04-28 ENCOUNTER — TELEPHONE (OUTPATIENT)
Facility: CLINIC | Age: 70
End: 2025-04-28

## 2025-04-28 NOTE — TELEPHONE ENCOUNTER
Faxed partial [facility] results to [facility] to DR. Beatriz Yeager   Faxed: [phone]  Phone: [phone]

## 2025-04-28 NOTE — PROGRESS NOTES
1- Bone Dexa: Start at age 70     2- Colonoscopy: 7/2/2024 Dr. Jasvir Barnard    3- Dental: Periodic     4- Eye Exam: 10/2024 by [facility]     5- Full Skin Exam: Sees Dr. Simmons     6- Heart Test:      7-  Strength:      8- Gait Speed:      9- Ankle Brachial Index:    ______________________________________________________________________     HSCRP: 2.2  Myeloperoxidase: Normal 186     LIPIDS:      TOTAL CHOL: Normal 120  HDL CHOL: Normal 63  TRIGLYCERIDES: Normal 68  LDL CHOL-C: Normal 43  CHOL/HDL-C: Normal 1.9  NON-HDL CHOL: Normal 57  APO B: Normal 54  LipoProtein (a):       GLUCOSE: 103  HbA1C: 6.2  TMAO: 44.2     VITAMIN B12: Normal 488     VITAMIN D: Normal 68.2     OMEGA CHECK:      CMP:      TSH: Normal 1.66     PSA: Normal 0.254     CBC:      UA:     Audiometry done on  :    Hearing aids Y/N = N      500 Hz 1000Hz 2000Hz 4000Hz   Left Ear: 25 dB   Y   Y   Y   N     Left Ear:   40 dB Y Y Y Y   Right Ear: 25 dB   Y   Y   Y   Y     Right Ear:   40 dB Y Y Y Y

## 2025-04-29 ENCOUNTER — OFFICE VISIT (OUTPATIENT)
Facility: CLINIC | Age: 70
End: 2025-04-29
Payer: MEDICARE

## 2025-04-29 ENCOUNTER — PATIENT MESSAGE (OUTPATIENT)
Facility: CLINIC | Age: 70
End: 2025-04-29

## 2025-04-29 VITALS
OXYGEN SATURATION: 98 % | HEART RATE: 70 BPM | WEIGHT: 191 LBS | BODY MASS INDEX: 27.35 KG/M2 | TEMPERATURE: 98 F | HEIGHT: 70 IN | DIASTOLIC BLOOD PRESSURE: 70 MMHG | SYSTOLIC BLOOD PRESSURE: 140 MMHG

## 2025-04-29 DIAGNOSIS — Z00.00 ROUTINE GENERAL MEDICAL EXAMINATION AT A HEALTH CARE FACILITY: Primary | ICD-10-CM

## 2025-04-29 DIAGNOSIS — R31.21 ASYMPTOMATIC MICROSCOPIC HEMATURIA: ICD-10-CM

## 2025-04-29 DIAGNOSIS — K86.2 PANCREATIC CYST (HCC): ICD-10-CM

## 2025-04-29 DIAGNOSIS — D80.2 LOW SERUM IGA AND IGM LEVELS (HCC): ICD-10-CM

## 2025-04-29 DIAGNOSIS — D80.9 IMMUNOGLOBULIN DEFICIENCY (HCC): ICD-10-CM

## 2025-04-29 DIAGNOSIS — I48.0 PAF (PAROXYSMAL ATRIAL FIBRILLATION) (HCC): ICD-10-CM

## 2025-04-29 DIAGNOSIS — E11.9 TYPE 2 DIABETES MELLITUS WITHOUT COMPLICATION, WITHOUT LONG-TERM CURRENT USE OF INSULIN (HCC): ICD-10-CM

## 2025-04-29 DIAGNOSIS — N28.1 KIDNEY CYSTS: ICD-10-CM

## 2025-04-29 DIAGNOSIS — D80.4 LOW SERUM IGA AND IGM LEVELS (HCC): ICD-10-CM

## 2025-04-29 PROCEDURE — G0439 PPPS, SUBSEQ VISIT: HCPCS | Performed by: INTERNAL MEDICINE

## 2025-04-29 PROCEDURE — 99214 OFFICE O/P EST MOD 30 MIN: CPT | Performed by: INTERNAL MEDICINE

## 2025-04-29 PROCEDURE — 93000 ELECTROCARDIOGRAM COMPLETE: CPT | Performed by: INTERNAL MEDICINE

## 2025-04-30 LAB
ATRIAL RATE: 66 BPM
P AXIS: -9 DEGREES
P-R INTERVAL: 156 MS
Q-T INTERVAL: 424 MS
QRS DURATION: 90 MS
QTC CALCULATION (BEZET): 444 MS
R AXIS: -6 DEGREES
T AXIS: 40 DEGREES
VENTRICULAR RATE: 66 BPM

## 2025-04-30 NOTE — TELEPHONE ENCOUNTER
Called Patient ([phone])  Advised that what was entered in the chart is just the office indicating the test was drawn; results may take about 2 wks to come back    Patient verbalized understanding

## 2025-04-30 NOTE — PROGRESS NOTES
Subjective:   Patient ID: Law Gerber is a 69 year old male.CPE plus discussion of multiple medical problems    CPE, feels well despite multiple medical problems        History/Other:   Review of Systems   Constitutional:  Positive for fatigue (from metoprolol).   HENT:  Positive for congestion (frequent sinus issues).         Tonsillectomy, old  facial scar   Eyes:  Positive for visual disturbance (floaters, retinal tears).   Respiratory:  Positive for apnea (on CPAP).         Covid 8/2024   Cardiovascular:         Lipids, Mitral valve repair, hx rheumatic fever, afib-ablation   Gastrointestinal:  Positive for diarrhea (occasional, could have IBS worsened by metformin).        Colon polyps, hemorrhoids, liver cysts, GB out, umbilical and inguinal hernias, reflux, diverticulosis, family hx colon CA-father   Endocrine:        Prediabetes possibly going into full Diabetes, thyroid ok   Genitourinary:  Positive for difficulty urinating and hematuria (chronic).        BPH with elevated PSA neg bx, kidney cysts, kidney stone, hematuria, contusion left kidney, hx retrograde ejaculation or no ejaculation   Musculoskeletal:  Positive for arthralgias (occasional) and back pain (occasional).        Occasional lumbar disc, knee problems   Skin: Negative.    Allergic/Immunologic:        See list   Neurological:  Negative for dizziness (hx BPPV 3/2014).   Hematological:  Bruises/bleeds easily (Eliquis).        Thal trait, low immunoglobulins   Psychiatric/Behavioral: Negative.         Current Outpatient Medications   Medication Sig Dispense Refill    Probiotic Product (PROBIOTIC DAILY OR) Probiotic 3 billion cell capsule, [RxNorm: 0]      ELDERBERRY OR Take 1 Piece of gum by mouth once a week. +ZINIC      Multiple Vitamin (MULTI-VITAMINS OR) Take 1 Piece of gum by mouth in the morning.      Clindamycin Phosphate 1 % External Gel Apply 1 Application topically 2 (two) times daily. Apply to scalp bid 30 g 3     triamcinolone 55 MCG/ACT Nasal Aerosol Take 1 spray daily as needed in each nostril to decongest the sinuses and ears; or take 45 minutes prior to airplane take-off for similar decongesting effect. 10.8 mL 0    amLODIPine 5 MG Oral Tab Take 1 tablet (5 mg total) by mouth daily.      metFORMIN 500 MG Oral Tab Take 1 tablet (500 mg total) by mouth 2 (two) times daily with meals. 180 tablet 3    lisinopril 20 MG Oral Tab Take 1 tablet (20 mg total) by mouth daily. 90 tablet 3    Blood Glucose Monitoring Suppl (ACCU-CHEK GUIDE) w/Device Does not apply Kit Check blood sugar once a day 1 kit 0    Glucose Blood (ONETOUCH ULTRA) In Vitro Strip TEST BLOOD SUGAR ONCE DAILY 100 each 3    Cholecalciferol (VITAMIN D-3) 25 MCG (1000 UT) Oral Cap Take 1 capsule by mouth daily.      Lutein-Zeaxanthin 20-1 MG Oral Cap lutein 20 mg-zeaxanthin 1,000 mcg capsule, [RxNorm: 3688996]      Cholecalciferol 50 MCG (2000 UT) Oral Tab Take 1 tablet (2,000 Units total) by mouth daily.      Accu-Chek FastClix Lancets Does not apply Misc USE ONCE DAILY AS DIRECTED 102 each 3    rosuvastatin 5 MG Oral Tab Take 1 tablet (5 mg total) by mouth nightly. 90 tablet 3    Metoprolol Succinate ER 50 MG Oral Tablet 24 Hr Take 1 tablet (50 mg total) by mouth daily.      ELIQUIS 5 MG Oral Tab TAKE 1 TABLET BY MOUTH TWICE DAILY 180 tablet 1    Multiple Vitamins-Minerals (COMPLETE DAILY/LUTEIN) Oral Tab Take by mouth.       Allergies:  Allergies   Allergen Reactions    Flomax [Tamsulosin] DIZZINESS    Aleve RASH    Levaquin [Levofloxacin Hemihydrate] DIARRHEA    Penicillins RASH and OTHER (SEE COMMENTS)    Vancomycin UNKNOWN       Objective:   Physical Exam  Constitutional:       General: He is not in acute distress.     Appearance: He is not ill-appearing.   HENT:      Right Ear: Tympanic membrane normal.      Left Ear: Tympanic membrane normal.      Nose: No congestion.      Mouth/Throat:      Pharynx: Oropharynx is clear.   Eyes:      Conjunctiva/sclera:  Conjunctivae normal.   Cardiovascular:      Rate and Rhythm: Normal rate and regular rhythm.      Heart sounds: Murmur (faint sys murmurhx MVR-ok) heard.      Comments: Sternal scar  Pulmonary:      Breath sounds: Normal breath sounds.   Abdominal:      General: Bowel sounds are normal.      Palpations: Abdomen is soft.      Tenderness: There is no abdominal tenderness.   Genitourinary:     Comments: Defer to uro  Musculoskeletal:         General: No tenderness. Normal range of motion.      Cervical back: Normal range of motion and neck supple.      Comments: Feet ok not diabetic   Skin:     Comments: normal   Neurological:      General: No focal deficit present.      Mental Status: He is alert. Mental status is at baseline.   Psychiatric:         Mood and Affect: Mood normal.         Thought Content: Thought content normal.       Barefoot diabetic exam postponed-new dx today      Active Problems:  Patient Active Problem List   Diagnosis    CHIDI (obstructive sleep apnea)    Alpha thalassemia 2    Kidney cysts    Liver cyst    Essential hypertension    Diverticulosis    BPH with elevated PSA    Hemorrhoids, internal    Dyslipidemia    Lumbar herniated disc    Kidney stone on right side    Heart valve disease    BPPV (benign paroxysmal positional vertigo)    Bilateral inguinal hernia    Family history of colon cancer    History of pneumonia    PAF (paroxysmal atrial fibrillation) (HCC)    Gastro-esophageal reflux disease with esophagitis    Gastritis    Colon polyps    Third degree hemorrhoids    Hematuria    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)    Low serum IgE    HLD (hyperlipidemia)    Reflux esophagitis    History of adenomatous polyp of colon    Benign neoplasm of cecum    Type 2 diabetes mellitus (HCC)       Past Medical History:  Past Medical History[1]    Past Surgical History:  Past Surgical History:   Procedure Laterality Date    Ablation  01/2020    [facility]    Biopsy of prostate,needle/punch  11/04/2011      negative    Bone marrow  1992    normal    Cardiac valve surgery  02/2004    mitral valve repair=[facility]    Cholecystectomy  03/2011    Colonoscopy  01/2012        Colonoscopy  03/22/2017    diverticulosis, internal hemorrhoids    Colonoscopy  04/02/2021    [facility]    Colonoscopy  07/02/2024    [facility]    Contact laser surgery of prostate  06/24/2022    [facility]-complicated by hematuria    Coronary artery angio s&i  1999    normal-[facility]    Cystoscopy,+ureteroscopy  01/25/2013    Cystoscopy     Cystoscopy,manipulatn  07/06/2022    and removal stent    Cystourethroscopy  06/24/2022    with lithotripsy and stent    Egd  07/02/2021    [facility]    Egd  07/02/2024    [facility]    Laser surgery of eye  2009    left    Tonsillectomy  1986    Umbilical hernia repair  03/2011       Family History:  Family History   Problem Relation Age of Onset    Colon Cancer Father         69    Other (Other) Father     Breast Cancer Maternal Grandmother         85?    Colon Cancer Maternal Grandmother         85?    Cancer Maternal Grandfather         Either colon or prostate cancer    Arthritis Mother     High Blood Pressure Mother     Heart Disorder Mother         a Fib    Hypertension Mother         70?    Other (Other) Mother     Other (psoriasis) Mother     Other (Kidney Cancer) Mother     Heart Disease Sister         TIAs/stent    High Blood Pressure Sister     Other (Lymphoma) Sister     Heart Disorder Sister         mvp    Heart Disorder Son         bi cuspid valve    Depression Son     Colon Cancer Paternal Grandfather         72    Other (migraine headaches) Sister         x3    Diabetes Paternal Grandmother     Diabetes Maternal Uncle        Social History:  Social History     Socioeconomic History    Marital status:      Spouse name: Leanne    Number of children: 4    Years of education: Not on file    Highest education level: Not on file   Occupational History    Occupation: CPA     Comment:  retired   Tobacco Use    Smoking status: Never    Smokeless tobacco: Never    Tobacco comments:     none   Vaping Use    Vaping status: Never Used   Substance and Sexual Activity    Alcohol use: Never    Drug use: Never    Sexual activity: Not on file   Other Topics Concern     Service Not Asked    Blood Transfusions Not Asked    Caffeine Concern No    Occupational Exposure Not Asked    Hobby Hazards Not Asked    Sleep Concern Not Asked    Stress Concern No    Weight Concern No    Special Diet No    Back Care Not Asked    Exercise Yes    Bike Helmet Not Asked    Seat Belt Yes    Self-Exams Not Asked   Social History Narrative    Not on file     Social Drivers of Health     Food Insecurity: No Food Insecurity (4/29/2025)    NCSS - Food Insecurity     Worried About Running Out of Food in the Last Year: No     Ran Out of Food in the Last Year: No   Transportation Needs: No Transportation Needs (4/29/2025)    NCSS - Transportation     Lack of Transportation: No   Stress: Not on file   Housing Stability: At Risk (4/29/2025)    NCSS - Housing/Utilities     Has Housing: Yes     Worried About Losing Housing: No     Unable to Get Utilities: Yes       Health Maintenance:    Immunization History   Administered Date(s) Administered    Covid-19 Vaccine Moderna 100 mcg/0.5 ml 02/12/2021, 03/15/2021    Covid-19 Vaccine Pfizer 30 mcg/0.3 ml 10/25/2021, 04/13/2022    Covid-19 Vaccine Pfizer Bivalent 30mcg/0.3mL 09/30/2022    Covid-19 Vaccine Pfizer Sly-Sucrose 30 mcg/0.3 ml 04/13/2022    FLU VAC High Dose 65 YRS & Older PRSV Free ([id number]) 09/14/2020, 10/13/2021, 09/28/2022, 09/29/2022, 09/19/2023    FLUZONE 6 months and older PFS 0.5 ml ([id number]) 09/22/2016    Flucelvax 0.5 Ml Quad PFS Single Dose 10/14/2019    High Dose Fluzone Influenza Vaccine, 65yr+ PF 0.5mL (77762) 10/13/2021, 09/19/2023, 09/27/2024    Influenza 10/05/2011, 11/07/2012, 11/18/2013, 11/03/2014, 10/19/2015, 09/21/2016, 10/14/2017, 09/25/2018, 10/14/2019     Moderna Covid-19 Vaccine 50mcg/0.5ml 12yrs+ 07/11/2024    Pfizer Covid-19 Vaccine 30mcg/0.3ml 12yrs+ 10/12/2023    Pneumococcal (Prevnar 13) 01/21/2015    Pneumovax 23 03/27/2018, 05/25/2024    RSV, recombinant, RSVpreF, adjuvanted (Arexvy) 12/01/2023    TD 06/26/2002    TDAP 11/26/2013, 04/04/2024    Zoster Vaccine Live (Zostavax) 07/14/2015    Zoster Vaccine Recombinant Adjuvanted (Shingrix) 07/15/2014, 02/11/2022, 07/19/2022         Vitals:  Vitals:    04/29/25 1017   BP: 140/70   BP Location: Right arm   Patient Position: Sitting   Cuff Size: adult   Pulse: 70   Temp: 98.3 °F (36.8 °C)   SpO2: 98%   Weight: 191 lb (86.6 kg)   Height: 5' 10\" (1.778 m)     Body mass index is 27.41 kg/m².      Health Screens    Anxiety Index: No major anxiety   Depression Index: No major depression   Cage Questionnaire: No indication of alcohol abuse  Sleepiness Index: Normal  Male Sexual Health Inventory: Normal  Metabolic syndrome (hypertension, lipid abnormality, obesity, pre-diabetic): Present, maybe diabetic at this point  Mini-Mental Examination: Normal in conversation  Nutritional Screen: Reviewed and discussed   SAFE Questions (Domestic Violence): Negative  Exercise: Active  Advance Directives:    (Scanned document on file)  Lookin Body: Wt 192, BMI 27  PBF  18      1- Bone Dexa: Start at age 70     2- Colonoscopy: 7/2/2024 Dr. Barnard would do 3yrs with hx colon CA father so 7/2027     3- Dental: Periodic     4- Eye Exam: 10/2024 by [facility]     5- Full Skin Exam: Sees Dr. Simmons     6- Heart Test:   Normal EKG except unchanged incomplete RBBB redo 4/2026  Echo heart and carotids just done good to 4/2026. Mild valvular heart disease and Pulm HTN, mild carotid plaque  Nuclear heart 4/2024 normal images, minor EKG changes, redo 4/2027     7-Other:  CXR normal 9/2024 redo 9/2025  US kidneys 1/2022: Bilateral kidney cysts, small stone on right, BPH hold on repeat  MRI abd 11/2024 following pancreatic  cysts-stable-kidney cysts stable no stone seen-redo 5/2025 for pancreas and kidneys      ______________________________________________________________________     Inflammation:  HSCRP: 2.2 elevated  Myeloperoxidase: Normal 186     LIPIDS:      TOTAL CHOL: Normal 120  HDL CHOL: Normal 63  TRIGLYCERIDES: Normal 68  LDL CHOL-C: Normal 43  CHOL/HDL-C: Normal 1.9  NON-HDL CHOL: Normal 57  APO B: Normal 54       GLUCOSE: 103  HbA1C: 6.2  Still elevated on Metformin prob now diabetic  Would add Jardiance or Ozempic    TMAO: 44.2 elevated colon toxin     VITAMIN B12: Normal 488     VITAMIN D: Normal 68.2     OMEGA CHECK: Pending     CMP: Normal except   Uric normal 5.8  Immunoglobulins were worked up by [facility] [place] as early as 2024 should recheck     TSH: Normal 1.66     PSA: Low Normal 0.254     CBC: Normal-despite hx thalessemia trait     UA: Hematuria     Audiometry almost normal         Assessment & Plan:   1. Routine general medical examination at a health care facility    2. PAF (paroxysmal atrial fibrillation) (HCC)    3. Type 2 diabetes mellitus without complication, without long-term current use of insulin (HCC)    4. Pancreatic cyst (HCC)    5. 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 Immunoglobulin deficiency (HCC)    7. Kidney cysts    8. Asymptomatic microscopic hematuria        Orders Placed This Encounter   Procedures    GALLERI    Immunoglobulin A/G/M, Quant [E]    Immunoglobulin E, Total [E]       Meds This Visit:  Requested Prescriptions      No prescriptions requested or ordered in this encounter       Imaging & Referrals:  ELECTROCARDIOGRAM, COMPLETE        SUMMARY:     Regarding cardiovascular health, history of mitral valve repair stable, hypertension controlled, lipids under control,but prediabetes escalating to diabetes. Fortunately, you do not smoke. US carotids and heart recently done by [facility] will get copies. PET nuclear in 4/2024 do 4/2027. Hx Mitral repair-[place].  Ablation 1/2020 follow up [facility]. Lose 10 lbs and avoid red meat, dairy and eggs(high TMAO and CRP)-normal MPO. Omega pending.      Regarding cancer, enlarged prostate with elevated PSA in past now normal after prostate procedures. Kidney cysts and history kidney stone recent imaging on MRI 11/2024 for pancreatic cysts, to be repeated 5/2025. Polyps but no sign of colon cancer on colonoscopy 7/2024 redo 7/2027-father hx colon CA. No sign of skin cancer on exam. Galleri cancer screen repeated.       Obstructive sleep apnea, stable on CPAP-has Pulmonary followup. Mild pulm HTN from valvular heart disease and CHIDI.       Musculoskeletal concerns with occasional lumbar disk and bilateral knee issues.     Gastrointestinal issues-IBS with occ diarrhea can do Imodium and will reduce Metformin, gallstones are out. Hemorrhoids polyps, diverticulosis, liver cyst, all stable. Can wait on inguinal hernia repair.     Prediabetes turning to diabetes. Plan to reduce metformin to help GI issues and add Jardiance or Ozempic. Continue eye exams, add foot exam next OV  with A1C, on ACE lisinopril and statin Crestor.  Consider CGM with diabetic educator.     7. Immunoglobulin deficiency-worked up recently  1562-1266 repeat levels.         CONCLUSIONS: Herb, your medical problems are stable need better diet and wt loss. Repeat labs in 3mos - A1C, TMAO, Immunoglobulins. Diabetic foot exam needed. Await repeat MRI abd at [facility] for kidneys and pancreas. Await Galleri results on Cancer screening. Consider Cystoscopy bladder with/without urine cytology to complete evaluation of hematuria. Copies sent to [facility] Morales and Yuki Yeager       TIME documentation:  45 min screening annual and 30 min detailed problem-oriented exam and discussion of Diabetes, Heart valve disease and PAF, Pancreatic and kidney cysts with hematuria, Immunoglobulin deficiency             [1]   Past Medical History:   Abdominal hernia    Alpha  thalassemia 2    Bilateral knee swelling    viral-old    Blood in urine    occasional trace    BPH with elevated PSA    Laser surgery 6-    BPPV (benign paroxysmal positional vertigo)    Calculus of kidney    right stone removed    Chickenpox    measles, mumps    Colon polyps    COVID    Diarrhea, unspecified    Only recently with rich or spiced foods    Disorder of prostate    enlarged prostate HOLEP procedure 6/24/22    Diverticulosis    colonoscopy-[facility]    Dyslipidemia    Essential hypertension    Facial laceration    60 stitches to face    Family history of colon cancer    father    Flatulence/gas pain/belching    Less in recent months    Gallstones    minda    GERD (gastroesophageal reflux disease)    Gross hematuria    complicating laser OR     Heart valve disease    mitral repair, also tricuspid and pulmonic insuff with pulm HTN    Hemorrhoids    Hemorrhoids, internal    colonoscopy-[facility]    History of cardiac murmur    valve repaired in 2004    History of colon polyps    History of mitral valve repair    History of pneumonia    History of rheumatic fever    Inguinal hernia    bilateral    Kidney cysts    U/S    Kidney stone on right side    U/S    Liver cyst    U/S    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

## 2025-05-01 NOTE — TELEPHONE ENCOUNTER
He can receive the hepatitis B vaccine while taking Humira.  This is an inactivated vaccine, it does not contain any live virus and will not cause  the hepatitis B infection.   Patient states he just got back from [place] and he feels like every year he goes down to [place] his allergies kick in and he develops a sinus infection.  Patient states he has a lot of sinus pressure, nasal/chest congestion, afebrile, minimally productive

## 2025-05-14 ENCOUNTER — OFFICE VISIT (OUTPATIENT)
Facility: CLINIC | Age: 70
End: 2025-05-14
Payer: MEDICARE

## 2025-05-14 ENCOUNTER — HOSPITAL ENCOUNTER (OUTPATIENT)
Dept: GENERAL RADIOLOGY | Facility: HOSPITAL | Age: 70
Discharge: HOME | End: 2025-05-14
Attending: INTERNAL MEDICINE
Payer: MEDICARE

## 2025-05-14 ENCOUNTER — TELEPHONE (OUTPATIENT)
Facility: CLINIC | Age: 70
End: 2025-05-14

## 2025-05-14 VITALS
WEIGHT: 188 LBS | OXYGEN SATURATION: 97 % | HEART RATE: 71 BPM | BODY MASS INDEX: 26.92 KG/M2 | DIASTOLIC BLOOD PRESSURE: 80 MMHG | SYSTOLIC BLOOD PRESSURE: 160 MMHG | HEIGHT: 70 IN | TEMPERATURE: 98 F

## 2025-05-14 DIAGNOSIS — E11.9 TYPE 2 DIABETES MELLITUS WITHOUT COMPLICATION, WITHOUT LONG-TERM CURRENT USE OF INSULIN (HCC): ICD-10-CM

## 2025-05-14 DIAGNOSIS — M25.561 ACUTE PAIN OF RIGHT KNEE: Primary | ICD-10-CM

## 2025-05-14 DIAGNOSIS — M25.561 ACUTE PAIN OF RIGHT KNEE: ICD-10-CM

## 2025-05-14 DIAGNOSIS — R09.89 LABILE HYPERTENSION: ICD-10-CM

## 2025-05-14 PROCEDURE — 99213 OFFICE O/P EST LOW 20 MIN: CPT | Performed by: INTERNAL MEDICINE

## 2025-05-14 PROCEDURE — 73560 X-RAY EXAM OF KNEE 1 OR 2: CPT | Performed by: INTERNAL MEDICINE

## 2025-05-14 RX ORDER — ZINC OXIDE 13 %
1 CREAM (GRAM) TOPICAL 2 TIMES DAILY
COMMUNITY
Start: 2025-05-14

## 2025-05-14 RX ORDER — METHYLPREDNISOLONE 4 MG/1
TABLET ORAL
Qty: 21 EACH | Refills: 0 | Status: SHIPPED | OUTPATIENT
Start: 2025-05-14

## 2025-05-14 NOTE — TELEPHONE ENCOUNTER
S/w pt on this    Reports doing yard work yesterday and rt knee was acting up    No pop or clicks    Sl swollen   Feels tight  Occ shooting pain  Can bear weight  No bruising    Cannot take nsaids d/t eliquis    Has not tried ice or tylenol    Wonders if he needs xray and or PT    I advised we would need to see him for this as PT would need notes if he ended up pursing.    I asked if he wanted to try supportive measures and monitor or be seen? He said he will come in.    Appt made for this afternoon.

## 2025-05-14 NOTE — TELEPHONE ENCOUNTER
Patient called and stated that he was working in the garden and his knee is in pain. He wants to know if he needs to do an x ray or physical therapy.

## 2025-05-15 ENCOUNTER — TELEPHONE (OUTPATIENT)
Facility: CLINIC | Age: 70
End: 2025-05-15

## 2025-05-15 NOTE — TELEPHONE ENCOUNTER
Mc Cardoza MD  [facility]  Call him 5/15 to add extra metformin 500 while on Medrol so take 3/day instead of 2/day for 6-7 days and watch BS.    --Informed patient via phone call.

## 2025-05-15 NOTE — PROGRESS NOTES
30 min came in with 4-5 day hx of acute pain right knee after odd jobs around the house. Has arthritic deformities both knees baseline. Medial tenderness and swelling right knee, without effusion. Xrays non-diagnostic. Can't take Nsaids on Elliquis and for same reason no shot of steroids today. Plan Medrol, PT, possible MRI and ortho to follow. Add extra metformin on Medrol and watch BS. BP up with excitement-on meds-will recheck at home.   Vitals:    05/14/25 1649   BP: 160/80   Pulse: 71   Temp: 98.4 °F (36.9 °C)

## 2025-06-09 ENCOUNTER — TELEPHONE (OUTPATIENT)
Facility: CLINIC | Age: 70
End: 2025-06-09

## 2025-06-25 ENCOUNTER — PATIENT MESSAGE (OUTPATIENT)
Facility: CLINIC | Age: 70
End: 2025-06-25

## 2025-07-02 ENCOUNTER — DOCUMENTATION ONLY (OUTPATIENT)
Facility: CLINIC | Age: 70
End: 2025-07-02

## 2025-07-23 ENCOUNTER — TELEPHONE (OUTPATIENT)
Facility: CLINIC | Age: 70
End: 2025-07-23

## 2025-07-23 DIAGNOSIS — E11.9 TYPE 2 DIABETES MELLITUS WITHOUT COMPLICATION, WITHOUT LONG-TERM CURRENT USE OF INSULIN (HCC): Primary | ICD-10-CM

## 2025-07-23 DIAGNOSIS — I25.10 CORONARY ARTERY DISEASE, UNSPECIFIED VESSEL OR LESION TYPE, UNSPECIFIED WHETHER ANGINA PRESENT, UNSPECIFIED WHETHER NATIVE OR TRANSPLANTED HEART: ICD-10-CM

## 2025-07-23 NOTE — TELEPHONE ENCOUNTER
Orders placed, informed patient to mention to lab to draw both labs from today and April 30th.     Mc Cardoza MD to Me      7/23/25 12:06 PM  CAD

## 2025-07-23 NOTE — TELEPHONE ENCOUNTER
Patient is calling to see if Dr. Cardoza has any follow up labs that he wanted him to do. He is thinking maybe it was a TMAO and A1C? The patient has to have labs done for another doctor and wanted to do everything in one trip.  Please investigate and call the patient back.

## 2025-07-23 NOTE — TELEPHONE ENCOUNTER
Patient aware should complete repeat A1C, TMAO, Immunoglobulins. Orders in for April and adding A1C and TMAO today. He is going to [facility] lab tomorrow.     What is the dx for TMAO lab please?     LOV   4/29/2025 Dr Cardoza   \"Repeat labs in 3mos - A1C, TMAO, Immunoglobulins.\"

## 2025-07-24 ENCOUNTER — LAB ENCOUNTER (OUTPATIENT)
Dept: LAB | Age: 70
End: 2025-07-24
Attending: INTERNAL MEDICINE
Payer: MEDICARE

## 2025-07-24 DIAGNOSIS — D80.8 LIGHT CHAIN DISEASE (HCC): ICD-10-CM

## 2025-07-24 DIAGNOSIS — D80.6 ANTIBODY DEFICIENCY SYNDROME (HCC): Primary | ICD-10-CM

## 2025-07-24 DIAGNOSIS — D80.4 SELECTIVE IGM IMMUNODEFICIENCY (HCC): ICD-10-CM

## 2025-07-24 DIAGNOSIS — I25.10 CORONARY ARTERY DISEASE, UNSPECIFIED VESSEL OR LESION TYPE, UNSPECIFIED WHETHER ANGINA PRESENT, UNSPECIFIED WHETHER NATIVE OR TRANSPLANTED HEART: ICD-10-CM

## 2025-07-24 DIAGNOSIS — D80.9 IMMUNOGLOBULIN DEFICIENCY (HCC): ICD-10-CM

## 2025-07-24 DIAGNOSIS — E11.9 TYPE 2 DIABETES MELLITUS WITHOUT COMPLICATION, WITHOUT LONG-TERM CURRENT USE OF INSULIN (HCC): ICD-10-CM

## 2025-07-24 DIAGNOSIS — D80.2 SELECTIVE IGA IMMUNODEFICIENCY (HCC): ICD-10-CM

## 2025-07-24 LAB
EST. AVERAGE GLUCOSE BLD GHB EST-MCNC: 134 MG/DL (ref 68–126)
HBA1C MFR BLD: 6.3 % (ref ?–5.7)
IGA SERPL-MCNC: <15 MG/DL (ref 40–350)
IGM SERPL-MCNC: 25.8 MG/DL (ref 50–300)
IMMUNOGLOBULIN PNL SER-MCNC: 1232 MG/DL (ref 650–1600)

## 2025-07-24 PROCEDURE — 82785 ASSAY OF IGE: CPT

## 2025-07-24 PROCEDURE — 83036 HEMOGLOBIN GLYCOSYLATED A1C: CPT

## 2025-07-24 PROCEDURE — 86581 STRPTCS PNEUM ANTB SEROT IA: CPT

## 2025-07-24 PROCEDURE — 36415 COLL VENOUS BLD VENIPUNCTURE: CPT

## 2025-07-24 PROCEDURE — 82784 ASSAY IGA/IGD/IGG/IGM EACH: CPT

## 2025-07-25 LAB — IGE SERPL-ACNC: 2.66 KU/L (ref 2–214)

## 2025-07-31 LAB
PNEU AB TYPE 1*: 4.1 UG/ML
PNEU AB TYPE 12 (12F)*: <0.1 UG/ML
PNEU AB TYPE 14*: 12.7 UG/ML
PNEU AB TYPE 17 (17F)*: 0.4 UG/ML
PNEU AB TYPE 19 (19F)*: 3.2 UG/ML
PNEU AB TYPE 2*: 0.5 UG/ML
PNEU AB TYPE 20*: 0.5 UG/ML
PNEU AB TYPE 22 (22F)*: 0.3 UG/ML
PNEU AB TYPE 23 (23F)*: 6.2 UG/ML
PNEU AB TYPE 26 (6B)*: 0.6 UG/ML
PNEU AB TYPE 3*: 0.4 UG/ML
PNEU AB TYPE 34 (10A)*: 1.7 UG/ML
PNEU AB TYPE 4*: 0.7 UG/ML
PNEU AB TYPE 43 (11A)*: 0.4 UG/ML
PNEU AB TYPE 5*: <0.1 UG/ML
PNEU AB TYPE 51 (7F)*: 0.4 UG/ML
PNEU AB TYPE 54 (15B)*: 2.3 UG/ML
PNEU AB TYPE 56 (18C)*: 1.9 UG/ML
PNEU AB TYPE 57 (19A)*: 1.2 UG/ML
PNEU AB TYPE 68 (9V)*: 3 UG/ML
PNEU AB TYPE 70 (33F)*: 2.8 UG/ML
PNEU AB TYPE 8*: 9 UG/ML
PNEU AB TYPE 9 (9N)*: 2.4 UG/ML

## (undated) DIAGNOSIS — R73.03 PREDIABETES: Primary | ICD-10-CM

## (undated) DIAGNOSIS — N20.0 KIDNEY STONE: Primary | ICD-10-CM

## (undated) DIAGNOSIS — I34.1 MVP (MITRAL VALVE PROLAPSE): Primary | ICD-10-CM

## (undated) NOTE — LETTER
[facility] [address] [address]    Phone: [phone]  Fax: [phone]       PATIENT NAME: Mc Herndon    YOB: 1955  SEX: male   MRN: [id number]      PATIENT PHONE NUMBER:   Telephone Information:   Home Phone [phone]   Mobile [phone]      SPECIMEN COLLECTION DATE: __________        TIME: __________    FASTING:     No        Yes  ________ (# of hours)     INITIALS: _____________    ORDERING PHYSICIAN: Mc Cardoza MD    NPI #: [id number] (Dr. Mc Cardoza)       [x] [id number] Apolipoprotein B        [x] [id number] CBC w/DIFF                   [x] [id number] CMP                                   [x] [id number] Hemoglobin A1C        [x] [id number] hsCRP                               [x] [id number] Lipid Panel                   [x] [id number] Myloperoxidase  [x] [id number] [place]  [x] [id number] TMAO (NMR LipoTube)  [x] [id number] TSH                  [x] [id number] Vit D, 25-Hydroxy       Special Instructions:

## (undated) NOTE — LETTER
01/29/21        [address]  [address] [facility] [address]    Dr. Thompson Merritt is recommending the following labs in 3 months: A1c and Elliott. Please call our office when you are ready to schedule your lab visit at [phone].

## (undated) NOTE — LETTER
Patient Name: Nhea Sanon  : 1955  MRN: [id number]  [facility]  [address] [place] [address]      Coronavirus Disease 2019 (COVID-19)     [facility] is committed to the safety and well-being of our patients, me carefully. If your symptoms get worse, call your healthcare provider immediately. 3. Get rest and stay hydrated.    4. If you have a medical appointment, call the healthcare provider ahead of time and tell them that you have or may have COVID-19.  5. For m of fever-reducing medications; and  · Improvement in respiratory symptoms (e.g., cough, shortness of breath); and  · At least 10 days have passed since symptoms first appeared OR if asymptomatic patient or date of symptom onset is unclear then use 10 days donors must:    · Have had a confirmed diagnosis of COVID-19  · Be symptom-free for at least 14 days*    *Some people will be required to have a repeat COVID-19 test in order to be eligible to donate.  If you’re instructed by Jennifer that a repeat test is r random. Researchers are trying to identify similarities between people with a Post-COVID condition to better understand if there are risk factors. How do I prevent a Post-COVID condition?   The best way to prevent the long-term symptoms of COVID-19 is

## (undated) NOTE — LETTER
07/01/19        Yony Peace  [address] [place] [address] [id number]      Dear Yinka Fermin,    [address]77 [facility] records indicate that you have outstanding lab work and or testing that was ordered for you and has not yet been completed:  Orders Placed This Encounter

## (undated) NOTE — LETTER
PATIENT NAME: Law Gerber     YOB: 1955  SEX: male   MRN: [id number]      PHONE NUMBER:   Telephone Information:   Home Phone [phone]   Mobile [phone]      SPECIMEN COLLECTION DATE: __________    TIME: __________    FASTING:     No        Yes  ________ (# of hours)     INITIALS: _____________    ORDERING PHYSICIAN: {[id number]}    NPI #: {NPI # ([id number]} [address] [id number]    Phone: [phone]  Fax: [phone]    CLIENT BILL    Special Instructions:      Call results to:     Fax results to:      [x] [id number] Apolipoprotein B        [x] 3269786 CBC w/DIFF                   [x] 8117976 CMP                                   [x] [id number] Hemoglobin A1C        [x] 5694204 hsCRP                               [x] 2434260 Lipid Panel               [x] [id number] Myloperoxidase  [x] [id number] [place]  [x] 3789458 TMAO (NMR LipoTube)  [x] 6695111 TSH                  [x] [id number] Vit D, 25-Hydroxy

## (undated) NOTE — LETTER
12/03/18        Pilar Juan  [address] [place] [address] [address] [address]      Dear Renetta Bustos,    [address] [facility] records indicate that you have outstanding lab work and or testing that was ordered for you and has not yet been completed:  Orders Placed This Encounter

## (undated) NOTE — MR AVS SNAPSHOT
[address]  [phone]               Thank you for choosing us for your health care visit with [facility].   We are glad to serve you and happy to provide you with this summary o COMPLETE DAILY/LUTEIN Tabs   Take by mouth. Dutasteride 0.5 MG Caps   TAKE ONE CAPSULE BY MOUTH AT BEDTIME   Commonly known as:  AVODART           Glucose Blood Strp   USE AS DIRECTED.    Commonly known as:  ACCU-CHEK SMARTVIEW           [employer] VITAM KETONES (URINE DIPSTICK) negative Negative mg/dL    SPECIFIC GRAVITY 1.020 1.005 - 1.030    OCCULT BLOOD small Negative    PH, URINE 5.5 4.5 - 8.0    PROTEIN (URINE DIPSTICK) negative Negative/Trace mg/dL    UROBILINOGEN,SEMI-QN 0.2 0.0 - 1.9 mg/dL    NIT

## (undated) NOTE — LETTER
06/02/21        Cat Level  [address] [place], [place] [id number]      Dear Shannan Bryan,    1579 [facility] records indicate that you have outstanding lab work and or testing that was ordered for you and has not yet been completed:  Orders Placed This [place]

## (undated) NOTE — LETTER
10/01/18        Ramiro Wilson  [address] [place], [place] [id number]      Dear Francisca Restrepo,    15[address] [facility] records indicate that you have outstanding lab work and or testing that was ordered for you and has not yet been completed:  Orders Placed This Encounter

## (undated) NOTE — LETTER
07/30/18        Wesley Rodríguez  [address] [place] [address]      Dear Susan Morales,    1579 [facility] records indicate that you have outstanding lab work and or testing that was ordered for you and has not yet been completed:          Hemoglobin A1C [E]

## (undated) NOTE — LETTER
[facility], [address] [place] [id number]  [facility]: [phone]  FAX: [phone]    Attention: [place] court of the  judicial circuit   Cheng [address]      Chavez Huerta has been under our care regarding ongoing medical issues. Because of this, he has been required to restrict his physical activities and is unable to serve on a jury at this time. Please feel free to contact us if there are any questions.     Thank you,    Shi Hernandez MD    2022   Chavez Huerta,  1955

## (undated) NOTE — MR AVS SNAPSHOT
[address], [address] [place] [phone] [phone]               Thank you for choosing us for your health care visit with Jorge Belcher MD.  We are glad to serve you and happy to provide you with this summa Commonly known as:  ZITHROMAX Z-LILIAN           CINNAMON OR   Take by mouth. COMPLETE DAILY/LUTEIN Tabs   Take by mouth.            Dutasteride 0.5 MG Caps   TAKE ONE CAPSULE BY MOUTH AT BEDTIME   Commonly known as:  AVODART           Glucose Blood Summaries. If you've been to the Emergency Department or your doctor's office, you can view your past visit information in [facility] by going to Visits < Visit Summaries. [facility] questions? Call [phone] for help.   [facility] is NOT to be used for urge

## (undated) NOTE — Clinical Note
05/24/2017        Frances Perez  [address] [place] [address]      Dear Evaristo Rick,    1579 [facility] records indicate that you have outstanding lab work that was ordered for you and has not yet been completed:      Hemoglobin A1C [E]  To provide you wi

## (undated) NOTE — MR AVS SNAPSHOT
[address], [address] [place], [place] [id number] [phone]               Thank you for choosing us for your health care visit with Munira Granados MD.  We are glad to serve you and happy to provide you with this summa COMPLETE DAILY/LUTEIN Tabs   Take by mouth. Dutasteride 0.5 MG Caps   TAKE ONE CAPSULE BY MOUTH AT BEDTIME   Commonly known as:  AVODART           Glucose Blood Strp   USE AS DIRECTED.    Commonly known as:  ACCU-CHEK SMARTVIEW           [employer] VITAM